# Patient Record
Sex: FEMALE | Race: ASIAN | NOT HISPANIC OR LATINO | Employment: FULL TIME | URBAN - METROPOLITAN AREA
[De-identification: names, ages, dates, MRNs, and addresses within clinical notes are randomized per-mention and may not be internally consistent; named-entity substitution may affect disease eponyms.]

---

## 2018-01-11 ENCOUNTER — TRANSFERRED RECORDS (OUTPATIENT)
Dept: HEALTH INFORMATION MANAGEMENT | Facility: CLINIC | Age: 48
End: 2018-01-11

## 2018-01-24 ENCOUNTER — TRANSFERRED RECORDS (OUTPATIENT)
Dept: HEALTH INFORMATION MANAGEMENT | Facility: CLINIC | Age: 48
End: 2018-01-24

## 2018-04-10 ENCOUNTER — TRANSFERRED RECORDS (OUTPATIENT)
Dept: HEALTH INFORMATION MANAGEMENT | Facility: CLINIC | Age: 48
End: 2018-04-10

## 2018-04-10 LAB — PAP-ABSTRACT: NORMAL

## 2018-05-15 ENCOUNTER — TRANSFERRED RECORDS (OUTPATIENT)
Dept: HEALTH INFORMATION MANAGEMENT | Facility: CLINIC | Age: 48
End: 2018-05-15

## 2018-05-15 LAB — PHQ9 SCORE: 5

## 2018-05-24 ENCOUNTER — TRANSFERRED RECORDS (OUTPATIENT)
Dept: HEALTH INFORMATION MANAGEMENT | Facility: CLINIC | Age: 48
End: 2018-05-24

## 2019-03-06 ENCOUNTER — TRANSFERRED RECORDS (OUTPATIENT)
Dept: HEALTH INFORMATION MANAGEMENT | Facility: CLINIC | Age: 49
End: 2019-03-06

## 2019-06-11 ENCOUNTER — TRANSFERRED RECORDS (OUTPATIENT)
Dept: HEALTH INFORMATION MANAGEMENT | Facility: CLINIC | Age: 49
End: 2019-06-11

## 2019-09-16 PROBLEM — D50.9 IRON DEFICIENCY ANEMIA: Status: ACTIVE | Noted: 2018-04-10

## 2019-09-16 PROBLEM — Z98.890 S/P CRANIOTOMY: Status: ACTIVE | Noted: 2018-01-11

## 2019-09-16 PROBLEM — R47.01 EXPRESSIVE APHASIA: Status: ACTIVE | Noted: 2018-01-11

## 2019-09-16 PROBLEM — E04.1 THYROID NODULE: Status: ACTIVE | Noted: 2017-12-26

## 2019-09-16 PROBLEM — Z86.73 HX OF ISCHEMIC LEFT MCA STROKE: Status: ACTIVE | Noted: 2018-01-11

## 2019-09-16 PROBLEM — R41.841 COGNITIVE COMMUNICATION DEFICIT: Status: ACTIVE | Noted: 2017-12-18

## 2019-09-16 PROBLEM — I67.5 MOYA MOYA DISEASE: Status: ACTIVE | Noted: 2018-01-11

## 2019-09-16 PROBLEM — E55.9 VITAMIN D DEFICIENCY: Status: ACTIVE | Noted: 2018-12-11

## 2019-09-16 PROBLEM — F90.9 ATTENTION DEFICIT HYPERACTIVITY DISORDER (ADHD), UNSPECIFIED ADHD TYPE: Status: ACTIVE | Noted: 2018-04-09

## 2019-09-16 RX ORDER — FERROUS SULFATE 324(65)MG
325 TABLET, DELAYED RELEASE (ENTERIC COATED) ORAL
COMMUNITY
End: 2019-09-17

## 2019-09-16 RX ORDER — OXYCODONE AND ACETAMINOPHEN 5; 325 MG/1; MG/1
1-2 TABLET ORAL
COMMUNITY
Start: 2018-05-26 | End: 2019-09-17

## 2019-09-16 RX ORDER — ASPIRIN 325 MG
325 TABLET ORAL DAILY
COMMUNITY
Start: 2018-01-16

## 2019-09-16 RX ORDER — FAMOTIDINE 20 MG/1
20 TABLET, FILM COATED ORAL DAILY
COMMUNITY
Start: 2018-05-27 | End: 2019-09-17

## 2019-09-16 RX ORDER — POLYETHYLENE GLYCOL 3350 17 G/17G
17 POWDER, FOR SOLUTION ORAL
COMMUNITY
Start: 2018-05-26 | End: 2019-09-17

## 2019-09-16 NOTE — PROGRESS NOTES
SUBJECTIVE:   Mariela Rodriguez is a 49 year old female who presents to clinic today for the following health issues:    History of Stroke/MoyaMoya:  Moyamoya disease/history of left hemispheric watershed type ischemic event in fall 2017. Cerebral revascularization was offered and patient has undergone this surgery on the left (worse side) 1/2018 and right 5/2018. She has mild expressive aphasia with some stuttering from her stroke, which has improved with OT and speech therapy - completed OT and speech therapy. Is following with Neurosurgery (Dr. Devyn Tamez and Giovanna Hernández annually). She was not on OCP's when she had her stroke, however, she was on OCP's in the past. No history of anticoagulative disorders.     Thyroid nodule  Incidental finding during her preop for her cerebral revascularization. The patient has not pursued further evaluation, though, reports weight gain. TSH on 12/10/18 was within normal limits. Exercises at home x2/week with body weight exercises. She does not perform cardiovascular exercises.     Iron deficiency anemia:  Not taking iron. Menstrual cycles are regular and last 8/27/2019. Last CBC on 12/10/18 was normal.    Elevated Blood Pressure:  She reports fluctuating hypertensive/hypotensive readings when her vitals were checked [facility]. She has a digital sphygmomanometer at home but does not recall her readings today. She does not take anti-hypertensive medications as she was not officially diagnosed with hypertension. She has been to a Cardiologist for this in the past who did not recommend she start anti-hypertensive medications.    BP Readings from Last 6 Encounters:   09/17/19 112/70     ADHD:   Diagnosed with ADHD when she was in 5th grade. She was on Ritalin in the past. Adderall was discontinued by her Neurosurgeon to avoid triggering tachycardia and hypertension and subsequently potentially CVA in a patient with MoyaMoya. She requests to be started on a different  stimulant to help with her focus/attention.     Vitamin D Deficiency:  She does not take vitamin D supplements despite being deficient. Mentions eating a well-balanced diet and tries to avoid excessive sugar/carbohydrates. Vitamin D level on 12/10/18 was 8 ng/mL.    Social:  The patient does not have a primary care provider. She has been to [facility] in the past. She was adopted when she was 6 years old.     Problem list and histories reviewed & adjusted, as indicated.  Additional history: as documented    Patient Active Problem List   Diagnosis     Attention deficit hyperactivity disorder (ADHD), unspecified ADHD type     Cognitive communication deficit     Expressive aphasia     Hx of ischemic left MCA stroke - 2017 - due to Moyamoya disease     Iron deficiency anemia     Tolbert tolbert disease     S/P craniotomy     Thyroid nodule     Vitamin D deficiency     Past Surgical History:   Procedure Laterality Date     C INTRA-EXTRACRANIAL ART ANASTOM  2018    left extracranial intercranial bypass supine position     extracranial bypass  2018    right extracranial intracranial bypass     TONSILLECTOMY       [facility]         Social History     Tobacco Use     Smoking status: Former Smoker     Last attempt to quit: 2017     Years since quittin.7     Smokeless tobacco: Never Used     Tobacco comment: social smoker    Substance Use Topics     Alcohol use: Yes     Alcohol/week: 3.0 oz     Types: 5 Standard drinks or equivalent per week     Family History   Adopted: Yes   Problem Relation Age of Onset     Unknown/Adopted Mother      Unknown/Adopted Father          Current Outpatient Medications   Medication Sig Dispense Refill     aspirin (ASA) 325 MG tablet Take 325 mg by mouth daily       atomoxetine (STRATTERA) 25 MG capsule Take 1 capsule (25 mg) by mouth daily 90 capsule 1     No Known Allergies    Reviewed and updated as needed this visit by clinical staff  Tobacco  Allergies   "Meds  Problems  Med Hx  Surg Hx  Fam Hx  Soc Hx        Reviewed and updated as needed this visit by Provider  Tobacco  Allergies  Meds  Problems  Med Hx  Surg Hx  Fam Hx  Soc Hx          ROS:  Constitutional, HEENT, cardiovascular, pulmonary, GI, , musculoskeletal, neuro, skin, endocrine and psych systems are negative, except as otherwise noted.    This document serves as a record of the services and decisions personally performed and made by Susan Brown, MS, PA-C. It was created on her behalf by Nnamdi King, a trained medical scribe. The creation of this document is based the provider's statements to the medical scribe.  Nnamdi King September 17, 2019 7:58 AM  OBJECTIVE:   /70 (BP Location: Right arm, Cuff Size: Adult Regular)   Pulse 99   Temp 98.4  F (36.9  C) (Oral)   Ht 1.568 m (5' 1.75\")   Wt 61.7 kg (136 lb)   LMP 08/27/2019 (Exact Date)   SpO2 98%   Breastfeeding? No   BMI 25.08 kg/m   Body mass index is 25.08 kg/m .    GENERAL: healthy, alert and no distress  EYES: Eyes grossly normal to inspection and conjunctivae and sclerae normal  HENT: Small, mobile nodule on the left lobe of her thyroid, otherwise, ear canals and TM's normal, nose and mouth without ulcers or lesions  RESP: lungs clear to auscultation - no rales, rhonchi or wheezes  CV: regular rate and rhythm, normal S1 S2, no S3 or S4, no murmur, click or rub, no peripheral edema and peripheral pulses strong  MS: no gross musculoskeletal defects noted, no edema  SKIN: no suspicious lesions or rashes  NEURO: Normal strength and tone, mentation intact and speech normal  PSYCH: mentation appears normal, affect normal/bright    Diagnostic Test Results:  Labs reviewed in Epic  ASSESSMENT/PLAN:   Mariela was seen today for establish care.    Diagnoses and all orders for this visit:    Tolbert tolbert disease  Hx of ischemic left MCA stroke - 12/2017 - due to Moyamoya disease  Stable. Follows with Dr. Tamez and Giovanna Hernández of " Neurosurgery annually. Last MRI was stable per patient report. Will check lipid panel today. Advised she continue to follow with her Neurosurgical team.  -     Lipid panel reflex to direct LDL Fasting    Attention deficit hyperactivity disorder (ADHD), unspecified ADHD type  Diagnosed with ADHD when she was in 5th grade. She was on Ritalin in the past. Adderall was discontinued by her Neurosurgeon to avoid tachycardia/hypertension and subsequently potentially CVA in a patient with MoyaMoya and history of ischemic left MCA stroke. She requested to be started on a different stimulant to help with her focus/attention. Will start her on Strattera 25 mg every day. Will check her Vitamin B6 and B12 levels.  -     atomoxetine (STRATTERA) 25 MG capsule; Take 1 capsule (25 mg) by mouth daily  -     Vitamin D Deficiency  -     Vitamin B12  -     Vitamin B6    Elevated BP without diagnosis of hypertension  Normotensive blood pressure today in clinic, however, she has had fluctuating readings at [facility] in the past. She has a digital sphygmomanometer at home but does not recall her readings today. She does not take anti-hypertensive medications as she was not officially diagnosed with hypertension. She has been to a Cardiologist for this in the past who did not recommend she start anti-hypertensive medications. Ordered a 24 hour blood pressure monitor for better assessment and a routine CMP.  -     24 Hour Blood Pressure Monitor - Adult; Future  -     Comprehensive metabolic panel    Vitamin D deficiency  Vitamin D level on 12/10/18 was 8 ng/mL. She does not take vitamin D supplements. I recommended she start Vitamin D supplements. Will recheck levels today.  -     Vitamin D Deficiency    Iron deficiency anemia, unspecified iron deficiency anemia type  Not taking iron supplements. Menstrual cycles are regular and last 8/27/2019. Last CBC on 12/10/18 was normal. Will recheck CBC and Ferritin levels today.  -      CBC with platelets  -     Ferritin    Thyroid nodule  Incidental finding during her preop for her cerebral revascularization. The patient has not pursued further evaluation, though, reports weight gain today. TSH on 12/10/18 was within normal limits. Exam today showed a small, mobile nodule in her left thyroid lobe. Will recheck thyroid functions and have her perform a thyroid ultrasound at her earliest convenience for reevaluation. I recommended exercise and a well-balanced diet to help with weight loss. I discussed that the American Heart Association recommends at least 150 minutes per week of moderate exercise or 75 minutes per week of vigorous exercise (or a combination of moderate and vigorous activity).  -     US Thyroid; Future  -     TSH  -     T4, free  -     T3, Free    Visit for screening mammogram  Preventative health care  She has not performed a Mammogram in the past. Ordered today.  -     *MA Screening Digital Bilateral; Future    Return in about 1 week (around 9/24/2019) for BP monitor, thyroid ultrasoun.    The information in this document, created by the medical scribe for me, accurately reflects the services I personally performed and the decisions made by me. I have reviewed and approved this document for accuracy prior to leaving the patient care area.  Susan Brown PA-C  [facility] [place]

## 2019-09-17 ENCOUNTER — OFFICE VISIT (OUTPATIENT)
Dept: FAMILY MEDICINE | Facility: CLINIC | Age: 49
End: 2019-09-17
Payer: COMMERCIAL

## 2019-09-17 VITALS
HEIGHT: 62 IN | DIASTOLIC BLOOD PRESSURE: 70 MMHG | BODY MASS INDEX: 25.03 KG/M2 | WEIGHT: 136 LBS | OXYGEN SATURATION: 98 % | TEMPERATURE: 98.4 F | SYSTOLIC BLOOD PRESSURE: 112 MMHG | HEART RATE: 99 BPM

## 2019-09-17 DIAGNOSIS — Z86.73 HX OF ISCHEMIC LEFT MCA STROKE: ICD-10-CM

## 2019-09-17 DIAGNOSIS — Z00.00 PREVENTATIVE HEALTH CARE: ICD-10-CM

## 2019-09-17 DIAGNOSIS — Z12.31 VISIT FOR SCREENING MAMMOGRAM: ICD-10-CM

## 2019-09-17 DIAGNOSIS — E55.9 VITAMIN D DEFICIENCY: ICD-10-CM

## 2019-09-17 DIAGNOSIS — E04.1 THYROID NODULE: ICD-10-CM

## 2019-09-17 DIAGNOSIS — R03.0 ELEVATED BP WITHOUT DIAGNOSIS OF HYPERTENSION: ICD-10-CM

## 2019-09-17 DIAGNOSIS — I67.5 MOYA MOYA DISEASE: Primary | ICD-10-CM

## 2019-09-17 DIAGNOSIS — D50.9 IRON DEFICIENCY ANEMIA, UNSPECIFIED IRON DEFICIENCY ANEMIA TYPE: ICD-10-CM

## 2019-09-17 DIAGNOSIS — E78.1 HYPERTRIGLYCERIDEMIA: ICD-10-CM

## 2019-09-17 DIAGNOSIS — F90.9 ATTENTION DEFICIT HYPERACTIVITY DISORDER (ADHD), UNSPECIFIED ADHD TYPE: ICD-10-CM

## 2019-09-17 LAB
ERYTHROCYTE [DISTWIDTH] IN BLOOD BY AUTOMATED COUNT: 14.8 % (ref 10–15)
HCT VFR BLD AUTO: 36.8 % (ref 35–47)
HGB BLD-MCNC: 11.7 G/DL (ref 11.7–15.7)
MCH RBC QN AUTO: 24.9 PG (ref 26.5–33)
MCHC RBC AUTO-ENTMCNC: 31.8 G/DL (ref 31.5–36.5)
MCV RBC AUTO: 79 FL (ref 78–100)
PLATELET # BLD AUTO: 397 10E9/L (ref 150–450)
RBC # BLD AUTO: 4.69 10E12/L (ref 3.8–5.2)
T3FREE SERPL-MCNC: 2.4 PG/ML (ref 2.3–4.2)
VIT B12 SERPL-MCNC: 446 PG/ML (ref 193–986)
WBC # BLD AUTO: 5.7 10E9/L (ref 4–11)

## 2019-09-17 PROCEDURE — 84207 ASSAY OF VITAMIN B-6: CPT | Mod: 90 | Performed by: PHYSICIAN ASSISTANT

## 2019-09-17 PROCEDURE — 85027 COMPLETE CBC AUTOMATED: CPT | Performed by: PHYSICIAN ASSISTANT

## 2019-09-17 PROCEDURE — 82607 VITAMIN B-12: CPT | Performed by: PHYSICIAN ASSISTANT

## 2019-09-17 PROCEDURE — 84481 FREE ASSAY (FT-3): CPT | Performed by: PHYSICIAN ASSISTANT

## 2019-09-17 PROCEDURE — 99204 OFFICE O/P NEW MOD 45 MIN: CPT | Performed by: PHYSICIAN ASSISTANT

## 2019-09-17 PROCEDURE — 36415 COLL VENOUS BLD VENIPUNCTURE: CPT | Performed by: PHYSICIAN ASSISTANT

## 2019-09-17 PROCEDURE — 99000 SPECIMEN HANDLING OFFICE-LAB: CPT | Performed by: PHYSICIAN ASSISTANT

## 2019-09-17 PROCEDURE — 84443 ASSAY THYROID STIM HORMONE: CPT | Performed by: PHYSICIAN ASSISTANT

## 2019-09-17 PROCEDURE — 82728 ASSAY OF FERRITIN: CPT | Performed by: PHYSICIAN ASSISTANT

## 2019-09-17 PROCEDURE — 84439 ASSAY OF FREE THYROXINE: CPT | Performed by: PHYSICIAN ASSISTANT

## 2019-09-17 PROCEDURE — 80061 LIPID PANEL: CPT | Performed by: PHYSICIAN ASSISTANT

## 2019-09-17 PROCEDURE — 83721 ASSAY OF BLOOD LIPOPROTEIN: CPT | Mod: 59 | Performed by: PHYSICIAN ASSISTANT

## 2019-09-17 PROCEDURE — 80053 COMPREHEN METABOLIC PANEL: CPT | Performed by: PHYSICIAN ASSISTANT

## 2019-09-17 PROCEDURE — 82306 VITAMIN D 25 HYDROXY: CPT | Performed by: PHYSICIAN ASSISTANT

## 2019-09-17 RX ORDER — ATOMOXETINE 25 MG/1
25 CAPSULE ORAL DAILY
Qty: 90 CAPSULE | Refills: 1 | Status: SHIPPED | OUTPATIENT
Start: 2019-09-17 | End: 2020-02-13

## 2019-09-17 ASSESSMENT — MIFFLIN-ST. JEOR: SCORE: 1201.17

## 2019-09-18 LAB
ALBUMIN SERPL-MCNC: 4.2 G/DL (ref 3.4–5)
ALP SERPL-CCNC: 67 U/L (ref 40–150)
ALT SERPL W P-5'-P-CCNC: 19 U/L (ref 0–50)
ANION GAP SERPL CALCULATED.3IONS-SCNC: 5 MMOL/L (ref 3–14)
AST SERPL W P-5'-P-CCNC: 15 U/L (ref 0–45)
BILIRUB SERPL-MCNC: 0.2 MG/DL (ref 0.2–1.3)
BUN SERPL-MCNC: 10 MG/DL (ref 7–30)
CALCIUM SERPL-MCNC: 8.6 MG/DL (ref 8.5–10.1)
CHLORIDE SERPL-SCNC: 106 MMOL/L (ref 94–109)
CHOLEST SERPL-MCNC: 280 MG/DL
CO2 SERPL-SCNC: 25 MMOL/L (ref 20–32)
CREAT SERPL-MCNC: 0.66 MG/DL (ref 0.52–1.04)
FERRITIN SERPL-MCNC: 4 NG/ML (ref 8–252)
GFR SERPL CREATININE-BSD FRML MDRD: >90 ML/MIN/{1.73_M2}
GLUCOSE SERPL-MCNC: 116 MG/DL (ref 70–99)
HDLC SERPL-MCNC: 35 MG/DL
LDLC SERPL CALC-MCNC: ABNORMAL MG/DL
LDLC SERPL DIRECT ASSAY-MCNC: 105 MG/DL
NONHDLC SERPL-MCNC: 245 MG/DL
POTASSIUM SERPL-SCNC: 4.1 MMOL/L (ref 3.4–5.3)
PROT SERPL-MCNC: 8.1 G/DL (ref 6.8–8.8)
SODIUM SERPL-SCNC: 136 MMOL/L (ref 133–144)
T4 FREE SERPL-MCNC: 0.98 NG/DL (ref 0.76–1.46)
TRIGL SERPL-MCNC: 750 MG/DL
TSH SERPL DL<=0.005 MIU/L-ACNC: 1.06 MU/L (ref 0.4–4)

## 2019-09-19 ENCOUNTER — HOSPITAL ENCOUNTER (OUTPATIENT)
Dept: ULTRASOUND IMAGING | Facility: CLINIC | Age: 49
Discharge: HOME | End: 2019-09-19
Attending: PHYSICIAN ASSISTANT | Admitting: PHYSICIAN ASSISTANT
Payer: COMMERCIAL

## 2019-09-19 DIAGNOSIS — E04.1 THYROID NODULE: ICD-10-CM

## 2019-09-19 PROBLEM — E78.1 HYPERTRIGLYCERIDEMIA: Status: ACTIVE | Noted: 2019-09-19

## 2019-09-19 PROBLEM — R03.0 ELEVATED BP WITHOUT DIAGNOSIS OF HYPERTENSION: Status: ACTIVE | Noted: 2019-09-19

## 2019-09-19 LAB
DEPRECATED CALCIDIOL+CALCIFEROL SERPL-MC: 24 UG/L (ref 20–75)
VIT B6 SERPL-MCNC: 22.6 NMOL/L (ref 20–125)

## 2019-09-19 PROCEDURE — 76536 US EXAM OF HEAD AND NECK: CPT

## 2019-09-19 RX ORDER — FENOFIBRATE 145 MG/1
145 TABLET, COATED ORAL DAILY
Qty: 90 TABLET | Refills: 0 | Status: SHIPPED | OUTPATIENT
Start: 2019-09-19 | End: 2019-10-02

## 2019-09-20 NOTE — RESULT ENCOUNTER NOTE
PLease call pt.  Her thyroid ultrasound shows multiple nodules, however two are larger than the others (the small ones are not concerning).  Due to a bit of calcification in the nodules and the size >1 cm in diameter I would like to get a fine needle aspiration biopsy to ensure these are benign (highly likely this is the case!).  If pt is in agreement, I will order the scan.  Please route back to me and I will order.

## 2019-09-26 ENCOUNTER — HOSPITAL ENCOUNTER (OUTPATIENT)
Dept: CARDIOLOGY | Facility: CLINIC | Age: 49
Discharge: HOME | End: 2019-09-26
Attending: PHYSICIAN ASSISTANT | Admitting: PHYSICIAN ASSISTANT
Payer: COMMERCIAL

## 2019-09-26 DIAGNOSIS — R03.0 ELEVATED BP WITHOUT DIAGNOSIS OF HYPERTENSION: ICD-10-CM

## 2019-09-26 PROCEDURE — 93790 AMBL BP MNTR W/SW I&R: CPT | Performed by: INTERNAL MEDICINE

## 2019-09-26 PROCEDURE — 93788 AMBL BP MNTR W/SW A/R: CPT

## 2019-09-27 ENCOUNTER — HOSPITAL ENCOUNTER (OUTPATIENT)
Dept: ULTRASOUND IMAGING | Facility: CLINIC | Age: 49
Discharge: HOME | End: 2019-09-27
Attending: PHYSICIAN ASSISTANT | Admitting: PHYSICIAN ASSISTANT
Payer: COMMERCIAL

## 2019-09-27 DIAGNOSIS — E04.1 THYROID NODULE: ICD-10-CM

## 2019-09-27 PROCEDURE — 88173 CYTOPATH EVAL FNA REPORT: CPT | Mod: 26 | Performed by: RADIOLOGY

## 2019-09-27 PROCEDURE — 25000125 ZZHC RX 250: Performed by: RADIOLOGY

## 2019-09-27 PROCEDURE — 00000102 ZZHCL STATISTIC CYTO WRIGHT STAIN TC: Performed by: RADIOLOGY

## 2019-09-27 PROCEDURE — 88173 CYTOPATH EVAL FNA REPORT: CPT | Performed by: RADIOLOGY

## 2019-09-27 PROCEDURE — 10005 FNA BX W/US GDN 1ST LES: CPT

## 2019-09-27 RX ADMIN — LIDOCAINE HYDROCHLORIDE 2 ML: 10 INJECTION, SOLUTION EPIDURAL; INFILTRATION; INTRACAUDAL; PERINEURAL at 10:35

## 2019-09-27 NOTE — PROGRESS NOTES
Consent for left thyroid nodule obtained by Dr. Villarreal.  Pt tolerated procedure well.  States understanding of site care discharge instructions.  Pt discharged per self to home ambulatory with no evident bleeding or complications.

## 2019-10-01 ENCOUNTER — TELEPHONE (OUTPATIENT)
Dept: FAMILY MEDICINE | Facility: CLINIC | Age: 49
End: 2019-10-01

## 2019-10-01 ENCOUNTER — HEALTH MAINTENANCE LETTER (OUTPATIENT)
Age: 49
End: 2019-10-01

## 2019-10-01 DIAGNOSIS — E78.1 HYPERTRIGLYCERIDEMIA: Primary | ICD-10-CM

## 2019-10-01 LAB — COPATH REPORT: NORMAL

## 2019-10-01 NOTE — TELEPHONE ENCOUNTER
What would they propose for an alternative?  Can [facility] do a trial fibrate test claim?  I thought they were all generic      Susan Brown MS, PA-C

## 2019-10-01 NOTE — TELEPHONE ENCOUNTER
Called pharmacy per providers request.  Pharmacist is currently at lunch and technician asked for clinic to call back in about 20 minutes.    JACLYN JettN, RN  [employer] [facility]

## 2019-10-01 NOTE — TELEPHONE ENCOUNTER
Called [facility] regarding the fenofibrate medication.  Per pharmacist, insurance will  states cover the 54mg or the 160 mg dose.  Pharmacist is recommending the 160 mg dose.    Routing back to provider for review and advise as appropriate.  Pharmacy is pended.    JACLYN JettN, RN  [employer] T[facility]

## 2019-10-01 NOTE — TELEPHONE ENCOUNTER
Received notice from [facility] pharmacy that Fenofibrate medication in dose 145 mg is not covered by patients insurance.     Routing to provider to see if provider would like a PA started, and if so any reasoning that should be put in PA. Or if provider would like to change therapy, please send in a new prescription.     Routing to Prescribing provider    Rachana Alvarado MA

## 2019-10-02 RX ORDER — FENOFIBRATE 160 MG/1
160 TABLET ORAL DAILY
Qty: 90 TABLET | Refills: 1 | Status: SHIPPED | OUTPATIENT
Start: 2019-10-02 | End: 2020-02-13

## 2019-10-02 NOTE — RESULT ENCOUNTER NOTE
Please call pt.  Patient has a very mildly elevated systolic BP elevation, however, in light of her previous strokes and risk for recurrence, I would like to start her on a low dose BP medication.  If in agreement, please send in amlodipine 5 mg once daily #90, 1 refill.  Would like to see her back in 6 months for a med check for both this RX and the recent start of the cholesterol medication.  She should be fasting for this appt.  (reminder I will be out for  duty Apr 13-30, 2020)

## 2019-10-02 NOTE — RESULT ENCOUNTER NOTE
Please call patient.  Her thyroid biopsy is consistent with a benign process.  We will continue to follow this clinically and repeat ultrasounds annually for at least a few years to ensure stability.

## 2019-10-07 ENCOUNTER — TELEPHONE (OUTPATIENT)
Dept: FAMILY MEDICINE | Facility: CLINIC | Age: 49
End: 2019-10-07

## 2019-10-07 DIAGNOSIS — R03.0 ELEVATED BP WITHOUT DIAGNOSIS OF HYPERTENSION: Primary | ICD-10-CM

## 2019-10-07 RX ORDER — AMLODIPINE BESYLATE 5 MG/1
5 TABLET ORAL DAILY
Qty: 90 TABLET | Refills: 1 | Status: SHIPPED | OUTPATIENT
Start: 2019-10-07 | End: 2020-02-13

## 2019-10-07 NOTE — TELEPHONE ENCOUNTER
Pt called back and was advised of the results note and recommendations to start Amlodipine 5 mg  PO daily #90 with 1 refill and also start fenofibrate. Pt agreed with plan and noted this could be sent to [facility] in [facility].    Pt advised to recheck BP in 3 months or sooner if complications.     Danny Palmer RN   [facility] [place][note 2]

## 2019-10-11 NOTE — RESULT ENCOUNTER NOTE
Triage: please call pt with results/recommendations:     Mariela  I have reviewed your recent labs. Here are the results:    -Normal red blood cell (hgb) levels, normal white blood cell count and normal platelet levels.  -LDL(bad) cholesterol level is elevated, HDL(good) cholesterol level is low and your triglycerides are VERY elevated which can increase your heart disease risk.  A diet high in fat and simple carbohydrates, genetics and being overweight can contribute to this. ADVISE: exercising 150 minutes of aerobic exercise per week (30 minutes for 5 days per week or 50 minutes for 3 days per week are options), eating a low saturated fat/low carbohydrate diet, and omega-3 fatty acids (fish oil) 3534-4849 mg daily are helpful to improve this. I would like you to start on a cholesterol lowering medication called Tricor.  This was sent to your pharmacy.  You should recheck your fasting cholesterol panel by scheduling a lab-only appointment in 2 months  -Liver and gallbladder tests (ALT,AST, Alk phos,bilirubin) are normal.  -Kidney function (GFR) is normal.  -Sodium is normal.  -Potassium is normal.  -Calcium is normal.  -Glucose is slight elevated and may be a sign of early diabetes (prediabetes). ADVISE:: eating a low carbohydrate diet, exercising, trying to lose weight (if necessary) and rechecking your glucose level in 12 months.  -TSH (thyroid stimulating hormone) level is normal which indicates normal thyroid function.  -HIV test is normal.  -VERY Low iron store levels (ferritin).  ADVISE: increasing iron in your diet and consider taking iron supplement called Vitron C twice daily - to avoid constipation from the supplement you should increase fluid intake and fiber in your diet)  Also, recheck your labs in 2 months.  -B12 level is normal.    For additional lab test information, labtestsonline.org is an excellent reference.     If you have any questions please do not hesitate to contact our office via phone  ([phone]) or [email or web address].    Susan Brown, MS, PA-C  [facility]   ventilator/Cardiac Monitor/Defib/ACLS/Rescue Kit/O2/BVM

## 2019-10-14 ENCOUNTER — HOSPITAL ENCOUNTER (OUTPATIENT)
Dept: MAMMOGRAPHY | Facility: CLINIC | Age: 49
Discharge: HOME | End: 2019-10-14
Attending: PHYSICIAN ASSISTANT | Admitting: PHYSICIAN ASSISTANT
Payer: COMMERCIAL

## 2019-10-14 DIAGNOSIS — Z00.00 PREVENTATIVE HEALTH CARE: ICD-10-CM

## 2019-10-14 PROCEDURE — 77067 SCR MAMMO BI INCL CAD: CPT

## 2020-02-11 NOTE — PROGRESS NOTES
"  SUBJECTIVE:   Mariela Rodriguez is a 49 year old female who presents to clinic today for the following health issues:    Hypertension Follow-up  Patient was taking amlodipine 5 mg medication daily.  Ran out ~1 month ago.      Do you check your blood pressure regularly outside of the clinic? Yes     Are you following a low salt diet? No    Are your blood pressures ever more than 140 on the top number (systolic) OR more   than 90 on the bottom number (diastolic), for example 140/90? Yes    Hyperlipidemia/[place] Disease/History of ischemic left MCA stroke  Patient had a history of left MCA ischemic stroke in December 2017.  She has hyperlipidemia that has gone untreated until recently.  She was started on fenofibrate in October 2019 secondary to hypertriglyceridemia.  She ran out of this medication approximately 1 month ago.  She was tolerating this well.  She continues to take aspirin 325 mg once daily.  Her last imaging was 6/11/2019 and was stable.  She follows with neurosurgery once a year.    Recent Labs   Lab Test 09/17/19  1011   CHOL 280*   HDL 35*   LDL Cannot estimate LDL when triglyceride exceeds 400 mg/dL  105*   TRIG 750*         ADD  Lengthy discussion in September 2019 regarding the use of stimulants with her underlying moyamoya disease and history of ischemic stroke.  She was prescribed Strattera 9/17/2019 and reports that she \"did not like the way that it made her feel \".  She is really hoping to be prescribed something like Vyvanse.  Adderall has worked well for her in the past.  Ritalin did not work as well.  She is struggling with her focus and task completion during work.  She does not understand why she should not be on a stimulant medication with her underlying co morbidities and reports that her neurosurgeon is \"okay with it \".      Problem list and histories reviewed & adjusted, as indicated.  Additional history: as documented    Patient Active Problem List   Diagnosis     Attention " deficit hyperactivity disorder (ADHD), unspecified ADHD type     Cognitive communication deficit     Expressive aphasia     Hx of ischemic left MCA stroke - 12/2017 - due to Moyamoya disease     Iron deficiency anemia     Tolbert tolbert disease     S/P craniotomy     Thyroid nodule - FNA consistent with benign process 9/2019, repeat ultrasound 10/2020     Vitamin D deficiency     Hypertriglyceridemia     Elevated BP without diagnosis of hypertension     MAGEN II (cervical intraepithelial neoplasia II)     Past Surgical History:   Procedure Laterality Date     C INTRA-EXTRACRANIAL ART ANASTOM  01/11/2018    left extracranial intercranial bypass supine position     extracranial bypass  05/24/2018    right extracranial intracranial bypass     TONSILLECTOMY       [facility]         Social History     Tobacco Use     Smoking status: Former Smoker     Last attempt to quit: 2017     Years since quitting: 3.1     Smokeless tobacco: Never Used     Tobacco comment: social smoker    Substance Use Topics     Alcohol use: Yes     Alcohol/week: 5.0 standard drinks     Types: 5 Standard drinks or equivalent per week     Family History   Adopted: Yes   Problem Relation Age of Onset     Unknown/Adopted Mother      Unknown/Adopted Father          Current Outpatient Medications   Medication Sig Dispense Refill     amLODIPine (NORVASC) 5 MG tablet Take 1 tablet (5 mg) by mouth daily 90 tablet 1     buPROPion (WELLBUTRIN XL) 150 MG 24 hr tablet Take 1 tablet (150 mg) by mouth every morning 30 tablet 0     fenofibrate (TRIGLIDE/LOFIBRA) 160 MG tablet Take 1 tablet (160 mg) by mouth daily 90 tablet 1     aspirin (ASA) 325 MG tablet Take 325 mg by mouth daily       ferrous fumarate 65 mg, [place]. FE,-Vitamin C 125 mg (VITRON-C)  MG TABS tablet Take 1 tablet by mouth 2 times daily (with meals) (Patient not taking: Reported on 2/13/2020) 100 tablet 3     No Known Allergies    Reviewed and updated as needed this visit by clinical  "staff  Tobacco  Allergies  Meds  Problems  Med Hx  Surg Hx  Fam Hx  Soc Hx        Reviewed and updated as needed this visit by Provider  Tobacco  Allergies  Meds  Problems  Med Hx  Surg Hx  Fam Hx         ROS:  Constitutional, HEENT, cardiovascular, pulmonary, GI, , musculoskeletal, neuro, skin, endocrine and psych systems are negative, except as otherwise noted.    OBJECTIVE:   /78 (BP Location: Right arm, Cuff Size: Adult Regular)   Pulse 123   Temp 98.9  F (37.2  C) (Oral)   Ht 1.568 m (5' 1.75\")   Wt 62.6 kg (138 lb)   SpO2 98%   BMI 25.45 kg/m   Body mass index is 25.45 kg/m .    GENERAL: healthy, alert and no distress  RESP: lungs clear to auscultation - no rales, rhonchi or wheezes  CV: regular rate and rhythm, normal S1 S2, no S3 or S4, no murmur, click or rub, no peripheral edema and peripheral pulses strong  MS: no gross musculoskeletal defects noted, no edema  SKIN: no suspicious lesions or rashes  NEURO: Normal strength and tone, mentation intact and speech normal  PSYCH: mentation appears normal, affect normal/bright, very distractible during interview    Diagnostic Test Results:  none   ASSESSMENT/PLAN:   Mariela was seen today for recheck medication and imm/inj.    Diagnoses and all orders for this visit:    Hypertriglyceridemia,   Tolbert tolbert disease  Hx of ischemic left MCA stroke - 12/2017 - due to Moyamoya disease  Recommended restart of medication.  After on medications for approximately 1 month casual fasting lab only appointment to recheck cholesterol levels.  -     fenofibrate (TRIGLIDE/LOFIBRA) 160 MG tablet; Take 1 tablet (160 mg) by mouth daily    Elevated BP without diagnosis of hypertension  Restart medications.  Recheck labs in approximately 1 month with fasting lab visit.  -     amLODIPine (NORVASC) 5 MG tablet; Take 1 tablet (5 mg) by mouth daily    Attention deficit hyperactivity disorder (ADHD), unspecified ADHD type  Advised patient that stimulant medication " is not recommended with her underlying comorbidities.  Patient frustrated that she is unable to restart stimulant medications.  Due to failed trial of Strattera recommend attempt of bupropion to see if this helps focus and overall generalized anxiety.  Patient voiced understanding and agreement.  -     buPROPion (WELLBUTRIN XL) 150 MG 24 hr tablet; Take 1 tablet (150 mg) by mouth every morning    Need for prophylactic vaccination and inoculation against influenza  Vaccinated today  -     INFLUENZA VACCINE IM > 6 MONTHS VALENT IIV4 [08231]  -     Vaccine Administration, Initial [83942]        Patient Instructions   Restart medications    Schedule lab only in ~4 weeks (after on meds for ~1 month) - can schedule at any [facility] (fasting - 8 hours - black coffee/water are fine).    Check into mail order pharmacy preference for insurance and send info to me (send address or fax number to narrow down).      Return in about 4 weeks (around 3/12/2020) for Lab Work (fasting for ~8 hours).     [address] [address]  [email or web address]   Office: [phone]           Susan Brown MS, PA-C

## 2020-02-13 ENCOUNTER — OFFICE VISIT (OUTPATIENT)
Dept: FAMILY MEDICINE | Facility: CLINIC | Age: 50
End: 2020-02-13
Payer: COMMERCIAL

## 2020-02-13 VITALS
SYSTOLIC BLOOD PRESSURE: 138 MMHG | DIASTOLIC BLOOD PRESSURE: 78 MMHG | TEMPERATURE: 98.9 F | HEART RATE: 123 BPM | WEIGHT: 138 LBS | HEIGHT: 62 IN | OXYGEN SATURATION: 98 % | BODY MASS INDEX: 25.4 KG/M2

## 2020-02-13 DIAGNOSIS — F90.9 ATTENTION DEFICIT HYPERACTIVITY DISORDER (ADHD), UNSPECIFIED ADHD TYPE: ICD-10-CM

## 2020-02-13 DIAGNOSIS — R03.0 ELEVATED BP WITHOUT DIAGNOSIS OF HYPERTENSION: ICD-10-CM

## 2020-02-13 DIAGNOSIS — I67.5 MOYA MOYA DISEASE: ICD-10-CM

## 2020-02-13 DIAGNOSIS — Z86.73 HX OF ISCHEMIC LEFT MCA STROKE: ICD-10-CM

## 2020-02-13 DIAGNOSIS — E78.1 HYPERTRIGLYCERIDEMIA: Primary | ICD-10-CM

## 2020-02-13 DIAGNOSIS — Z23 NEED FOR PROPHYLACTIC VACCINATION AND INOCULATION AGAINST INFLUENZA: ICD-10-CM

## 2020-02-13 PROCEDURE — 90686 IIV4 VACC NO PRSV 0.5 ML IM: CPT | Performed by: PHYSICIAN ASSISTANT

## 2020-02-13 PROCEDURE — 90471 IMMUNIZATION ADMIN: CPT | Performed by: PHYSICIAN ASSISTANT

## 2020-02-13 PROCEDURE — 99214 OFFICE O/P EST MOD 30 MIN: CPT | Mod: 25 | Performed by: PHYSICIAN ASSISTANT

## 2020-02-13 RX ORDER — AMLODIPINE BESYLATE 5 MG/1
5 TABLET ORAL DAILY
Qty: 90 TABLET | Refills: 1 | Status: SHIPPED | OUTPATIENT
Start: 2020-02-13 | End: 2020-06-03

## 2020-02-13 RX ORDER — FENOFIBRATE 160 MG/1
160 TABLET ORAL DAILY
Qty: 90 TABLET | Refills: 1 | Status: SHIPPED | OUTPATIENT
Start: 2020-02-13 | End: 2020-06-03

## 2020-02-13 RX ORDER — BUPROPION HYDROCHLORIDE 150 MG/1
150 TABLET ORAL EVERY MORNING
Qty: 30 TABLET | Refills: 0 | Status: SHIPPED | OUTPATIENT
Start: 2020-02-13 | End: 2020-03-19

## 2020-02-13 ASSESSMENT — MIFFLIN-ST. JEOR: SCORE: 1210.24

## 2020-02-13 NOTE — PATIENT INSTRUCTIONS
Restart medications    Schedule lab only in ~4 weeks (after on meds for ~1 month) - can schedule at any [facility] (fasting - 8 hours - black coffee/water are fine).    Check into mail order pharmacy preference for insurance and send info to me (send address or fax number to narrow down).

## 2020-03-19 DIAGNOSIS — F90.9 ATTENTION DEFICIT HYPERACTIVITY DISORDER (ADHD), UNSPECIFIED ADHD TYPE: ICD-10-CM

## 2020-03-19 RX ORDER — BUPROPION HYDROCHLORIDE 150 MG/1
TABLET ORAL
Qty: 90 TABLET | Refills: 0 | Status: SHIPPED | OUTPATIENT
Start: 2020-03-19 | End: 2020-06-03

## 2020-03-19 NOTE — TELEPHONE ENCOUNTER
"Requested Prescriptions   Pending Prescriptions Disp Refills     buPROPion (WELLBUTRIN XL) 150 MG 24 hr tablet [Pharmacy Med Name: buPROPion HCl ER (XL) Oral Tablet Extended Release 24 Hour 150 MG] 30 tablet 0     Sig: TAKE ONE TABLET BY MOUTH EVERY MORNING   Last Written Prescription Date:  2/13/20  Last Fill Quantity: 30,  # refills: 0   Last office visit: 2/13/2020 with prescribing provider:  Kevin   Future Office Visit:        SSRIs Protocol Passed - 3/19/2020 12:18 PM   No flowsheet data found.       Passed - Recent (12 mo) or future (30 days) visit within the authorizing provider's specialty     Patient has had an office visit with the authorizing provider or a provider within the authorizing providers department within the previous 12 mos or has a future within next 30 days. See \"Patient Info\" tab in inbasket, or \"Choose Columns\" in Meds & Orders section of the refill encounter.              Passed - Medication is Bupropion     If the medication is Bupropion (Wellbutrin), and the patient is taking for smoking cessation; OK to refill.          Passed - Medication is active on med list        Passed - Patient is age 18 or older        Passed - No active pregnancy on record        Passed - No positive pregnancy test in last 12 months             "

## 2020-06-03 DIAGNOSIS — F90.9 ATTENTION DEFICIT HYPERACTIVITY DISORDER (ADHD), UNSPECIFIED ADHD TYPE: ICD-10-CM

## 2020-06-03 DIAGNOSIS — E78.1 HYPERTRIGLYCERIDEMIA: ICD-10-CM

## 2020-06-03 DIAGNOSIS — R03.0 ELEVATED BP WITHOUT DIAGNOSIS OF HYPERTENSION: ICD-10-CM

## 2020-06-03 RX ORDER — BUPROPION HYDROCHLORIDE 150 MG/1
150 TABLET ORAL EVERY MORNING
Qty: 90 TABLET | Refills: 0 | Status: SHIPPED | OUTPATIENT
Start: 2020-06-03 | End: 2020-10-22

## 2020-06-03 RX ORDER — FENOFIBRATE 160 MG/1
160 TABLET ORAL DAILY
Qty: 90 TABLET | Refills: 0 | Status: SHIPPED | OUTPATIENT
Start: 2020-06-03 | End: 2020-10-22

## 2020-06-03 RX ORDER — AMLODIPINE BESYLATE 5 MG/1
5 TABLET ORAL DAILY
Qty: 90 TABLET | Refills: 0 | Status: SHIPPED | OUTPATIENT
Start: 2020-06-03 | End: 2020-10-22

## 2020-06-03 NOTE — TELEPHONE ENCOUNTER
Reason for Call:  Medication or medication refill:    Do you use a [facility]?  Name of the pharmacy and phone number for the current request:  [address]    Name of the medication requested: amLODIPine (NORVASC) 5 MG tablet    buPROPion (WELLBUTRIN XL) 150 MG 24 hr tablet    fenofibrate (TRIGLIDE/LOFIBRA) 160 MG tablet    Other request: n/a    Can we leave a detailed message on this number? YES    Phone number patient can be reached at: Cell number on file:    Telephone Information:   Mobile [phone]       Best Time: any    Call taken on 6/3/2020 at 2:15 PM by Sandra Lares

## 2020-10-21 DIAGNOSIS — F90.9 ATTENTION DEFICIT HYPERACTIVITY DISORDER (ADHD), UNSPECIFIED ADHD TYPE: ICD-10-CM

## 2020-10-21 DIAGNOSIS — R03.0 ELEVATED BP WITHOUT DIAGNOSIS OF HYPERTENSION: ICD-10-CM

## 2020-10-21 NOTE — LETTER
[address] [address]  [phone]       November 2, 2020    Mariela Rodriguez  [address]   [place] [address]    Mariela:    We have been calling you regarding a recent refill request we received for wellbutrin and amlodipine.  Unfortunately, we were unable to reach you.  We are notifying you that you are due for a fasting physical with labs  prior to your next refill.  You can schedule this appointment via [employer] or by calling the clinic at [phone].        Sincerely,    Susan Brown PA-C / tirso

## 2020-10-22 DIAGNOSIS — E78.1 HYPERTRIGLYCERIDEMIA: ICD-10-CM

## 2020-10-22 RX ORDER — AMLODIPINE BESYLATE 5 MG/1
TABLET ORAL
Qty: 30 TABLET | Refills: 0 | Status: SHIPPED | OUTPATIENT
Start: 2020-10-22 | End: 2021-08-04

## 2020-10-22 RX ORDER — BUPROPION HYDROCHLORIDE 150 MG/1
150 TABLET ORAL EVERY MORNING
Qty: 90 TABLET | Refills: 0 | Status: SHIPPED | OUTPATIENT
Start: 2020-10-22 | End: 2021-08-04

## 2020-10-22 RX ORDER — FENOFIBRATE 160 MG/1
160 TABLET ORAL DAILY
Qty: 30 TABLET | Refills: 0 | Status: SHIPPED | OUTPATIENT
Start: 2020-10-22 | End: 2021-08-04

## 2020-10-22 NOTE — TELEPHONE ENCOUNTER
30-day refill sent to pharmacy.  Advised patient that she is due for fasting physical with labs       Susan Brown MBA, MS, PA-C

## 2020-10-22 NOTE — TELEPHONE ENCOUNTER
Routing refill request to provider for review/approval because:  Labs not current:  Jennifer

## 2020-11-02 NOTE — TELEPHONE ENCOUNTER
Left non-detailed message for patient to call back.  Please schedule physical  when patient calls back.  (see previous notes for details)    I have been unable to reach this patient by phone.  A letter is being sent to the last known home address.    Thanks Tomeka

## 2020-12-21 ENCOUNTER — OFFICE VISIT - HEALTHEAST (OUTPATIENT)
Dept: BEHAVIORAL HEALTH | Facility: CLINIC | Age: 50
End: 2020-12-21

## 2020-12-21 DIAGNOSIS — F90.9 ATTENTION DEFICIT HYPERACTIVITY DISORDER (ADHD), UNSPECIFIED ADHD TYPE: ICD-10-CM

## 2021-01-15 ENCOUNTER — HEALTH MAINTENANCE LETTER (OUTPATIENT)
Age: 51
End: 2021-01-15

## 2021-01-20 ENCOUNTER — COMMUNICATION - HEALTHEAST (OUTPATIENT)
Dept: BEHAVIORAL HEALTH | Facility: CLINIC | Age: 51
End: 2021-01-20

## 2021-01-23 ENCOUNTER — HEALTH MAINTENANCE LETTER (OUTPATIENT)
Age: 51
End: 2021-01-23

## 2021-02-03 ENCOUNTER — OFFICE VISIT - HEALTHEAST (OUTPATIENT)
Dept: BEHAVIORAL HEALTH | Facility: CLINIC | Age: 51
End: 2021-02-03

## 2021-02-03 DIAGNOSIS — F90.9 ATTENTION DEFICIT HYPERACTIVITY DISORDER (ADHD), UNSPECIFIED ADHD TYPE: ICD-10-CM

## 2021-03-17 ENCOUNTER — TRANSFERRED RECORDS (OUTPATIENT)
Dept: HEALTH INFORMATION MANAGEMENT | Facility: CLINIC | Age: 51
End: 2021-03-17

## 2021-03-17 ENCOUNTER — OFFICE VISIT - HEALTHEAST (OUTPATIENT)
Dept: BEHAVIORAL HEALTH | Facility: CLINIC | Age: 51
End: 2021-03-17

## 2021-03-17 DIAGNOSIS — F90.9 ATTENTION DEFICIT HYPERACTIVITY DISORDER (ADHD), UNSPECIFIED ADHD TYPE: ICD-10-CM

## 2021-03-20 ENCOUNTER — IMMUNIZATION (OUTPATIENT)
Dept: NURSING | Facility: CLINIC | Age: 51
End: 2021-03-20
Payer: COMMERCIAL

## 2021-03-20 PROCEDURE — 0001A PR COVID VAC PFIZER DIL RECON 30 MCG/0.3 ML IM: CPT

## 2021-03-20 PROCEDURE — 91300 PR COVID VAC PFIZER DIL RECON 30 MCG/0.3 ML IM: CPT

## 2021-04-07 ENCOUNTER — COMMUNICATION - HEALTHEAST (OUTPATIENT)
Dept: BEHAVIORAL HEALTH | Facility: CLINIC | Age: 51
End: 2021-04-07

## 2021-04-10 ENCOUNTER — IMMUNIZATION (OUTPATIENT)
Dept: NURSING | Facility: CLINIC | Age: 51
End: 2021-04-10
Payer: COMMERCIAL

## 2021-04-10 PROCEDURE — 91300 PR COVID VAC PFIZER DIL RECON 30 MCG/0.3 ML IM: CPT

## 2021-04-10 PROCEDURE — 0002A PR COVID VAC PFIZER DIL RECON 30 MCG/0.3 ML IM: CPT

## 2021-06-13 NOTE — PROGRESS NOTES
This video/telephone visit will be conducted via a call between you and your physician/provider. We have found that certain health care needs can be provided without the need for an in-person physical exam. This service lets us provide the care you need with a video /telephone conversation. If a prescription is necessary we can send it directly to your pharmacy. If lab work is needed we can place an order for that and you can then stop by our lab to have the test done at a later time.    Just as we bill insurance for in-person visits, we also bill insurance for video/telephone visits. If you have questions about your insurance coverage, we recommend that you speak with your insurance company.    Patient has given verbal consent for video/Telephone visit? Yes  Patient would like telephone visit, please call:  [phone]  JORGE/SHIRIN LEE CMA    Patient verified allergies, medications and pharmacy via phone.  Patient states she is ready for visit.    Unable to review [place] , awaiting access from provider.

## 2021-06-13 NOTE — PROGRESS NOTES
Initial Outpatient Psychiatry Consult Note     The patient was seen on December 21 of 2020 for an initial psychiatric intake evaluation.  This was done by phone due to the coronavirus restrictions.  The patient has a history of moyamoya disease with a ischemic left MCA stroke occurring in December 2017.  She is followed through neurosurgery at [facility] and recently started treatment for underlying hyperlipidemia, starting fenofibrate in October 2019 due to her hypertriglyceridemia.  Neurosurgery reports her last imaging was in June 2019 and she was described as stable.  She reports to me that she has been medically cleared by neurosurgery to restart stimulant medication if we deem that appropriate.  She presents to this clinic with a longstanding ADHD.  At baseline she apparently struggles with some cognitive communication difficulties and some expressive aphasia related to the prior left MCA stroke.  This was diagnosed in fifth grade and she was on stimulants until eighth grade when she stopped the medication because she did not like being on medication.  She states she was put back on Adderall from 2010 until 2018 and that was extremely helpful with her underlying ADHD.  She reports that she also had been on Wellbutrin but was taken off that a few months ago because she was not sure it was helpful.  In hindsight she states her attention is gotten worse since the Wellbutrin has been discontinued.  She has had no prior psychiatric hospitalizations.  She is never had any suicidality.  No history of any psychosis or eating disorder symptoms.  No significant anxiety.  She denies having any significant depressive episodes but presents today requesting consideration for return to the stimulant medication as suggested by her neurosurgeon office due to the fact that she is struggling professionally as a  due to her ADD symptoms.    HPI:  Please see above.  The patient presents today for  consideration for restarting medication due to her underlying attention deficit disorder symptoms.  She presents today denying any history of significant depression.  She states she is never had any suicidality.  She is never had any suicide attempts.  No history of any psychosis.  She reports chronic difficulties with concentration and focus and this becomes worse if she does feel overwhelmed at work.  She has attempted techniques to try and stay focused but is having a harder time and believes that this is gotten worse since discontinuing the Wellbutrin a couple of months ago.    She reports she sleeps fairly well but admits that time she does not sleep because she becomes anxious about work and is worried she may lose her job due to her difficulties keeping up.  She denies any headaches or dizziness.  She reports she has no other new medical diagnoses or treatments.  She is also considering restarting therapy.  Her prior therapist apparently retired a couple of years ago.  She denies having any other questions or concerns.    Current Medications:  Medications were personally reviewed at this meeting.  Please see the chart for current medication list.         Past medical History:  Patient Active Problem List   Diagnosis     Attention deficit hyperactivity disorder (ADHD), unspecified ADHD type     Cognitive communication deficit     Expressive aphasia     Hx of ischemic left MCA stroke - 12/2017 - due to Moyamoya disease     Iron deficiency anemia     Tolbert tolbert disease     S/P craniotomy     Thyroid nodule - FNA consistent with benign process 9/2019, repeat ultrasound 10/2020     Vitamin D deficiency     Hypertriglyceridemia     Elevated BP without diagnosis of hypertension     MAGEN II (cervical intraepithelial neoplasia II)   Past Surgical History:   Procedure Laterality Date     C INTRA-EXTRACRANIAL ART ANASTOM 01/11/2018   left extracranial intercranial bypass supine position     extracranial bypass 05/24/2018    right extracranial intracranial bypass     TONSILLECTOMY     [facility]       Past Psychiatric History:  Please see above.  The patient has never been psychiatrically hospitalized.  No history of any suicidality.  She denies having any history of psychosis.  She is never had any significant depressive episodes but has struggled with situational depressive symptoms on a mild basis at multiple times through her life.  As previously stated the patient was on stimulants, she believes Ritalin, from the fifth grade to the eighth grade which was somewhat helpful but she stopped the medication because she did not want to take medication.  She has been off Wellbutrin for 2 months but had been on that I believe for a number of years and found this in hindsight to be helpful.  She was treated at a dose of 150 mg a day.  She was also on Strattera for a couple of months in the past but did not like how that made her feel.  She believes that she has had Adderall and Ritalin in the past which both been helpful.  She is apparently been off all stimulants since 2018.      Substance Abuse History:  No history of any alcohol abuse, illicit drug use or prescription medication misuse.  She has never been to detox and has never had a DWI.      Family History:  Patient is adopted.      Social History:  Patient was born and raised in [place].  She has a sister who is the biological daughter of her parents.  The patient graduated from high school but was in special classes for learning disabilities throughout her high school years.  She recalls being in classes for English and social studies and also attended summer school to get remedial help.  She has never been .  No children.  Currently not involved in a romantic relationship.  She has been working at [employer] for the last 8 years or so and apparently has been working in the [employer] business for 20 years.  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  She describes chronically having difficulty with attentional issues and attention to detail which have been helped by the use of medications including stimulants and Wellbutrin in the past as well as making lifestyle changes to avoid having to excessively multitask.  No history of any abuse.             Review Of Systems:  Patient denies having any current feelings of illness or any pain.  Please see above.         Mental Status Exam:  Appearance: Patient was interviewed by phone.  Behavior: Patient was cooperative, pleasant, friendly and participated well.  No avoidant behavior was noted.  She was in no distress.  Speech: Patient was talkative and occasionally needed some redirection but was able to provide adequate history.  Not pressured.  Mood/Affect: No obvious depression or anxiety.  No irritability.  No symptoms of heather.  Thought Content: No hallucinations or delusions  Suicidal or Homicidal Thoughts: None apparent or reported  Thought Process/Formulation: Patient describes chronic difficulty with distractibility but was able to stay on task pretty well.  She did need occasional redirection and prompts.  Occasionally she needed to be reorganized.  Associations: Adequate  Fund of Knowledge: Adequate  Attention/Concentration: Patient chronically has had difficulty with attention and distractibility but was able to participate in the interview with occasional prompts and some mild redirection  Insight: Grossly adequate  Judgement: Grossly intact  Memory: Grossly intact  Motor Status: Patient denies having any new tremors or asymmetries.  Fund of Knowledge: Grossly intact  Orientation: Grossly oriented        Diagnosis:    Attention deficit disorder, NOS, by history    Rule out cognitive disorder NOS secondary to prior CVA related to moyamoya disease    The patient was interviewed for 1 hour today.    Plan:  I recommend that I follow the patient from a psychiatric standpoint.  I  will assess for the appropriateness of possible psychotropic medication trials/changes.  At this time I am going to restart the patient on Wellbutrin XR at 150 mg a day.  She has been on this medication in the past and found it to be simply somewhat helpful.  She achieved a dose of 150 mg a day in the past and is willing to restart that medication.  We are considering restarting a stimulant.  She has been cleared by her neurosurgery office and would like us to consider that option.  Risks and benefits were again discussed with the patient and we may restart either Adderall or Ritalin at the follow-up appointment in 4 weeks after she has been restarted on the Wellbutrin.    We will consider additional neuropsych testing in the future but at this time I want to see how she does with the medication changes as described above.    We will also consider referring the patient back to an individual psychologist as her prior psychologist retired a few years ago.    The patient agrees to call with any questions or concerns.  The patient will seek out appropriate emergency services should that become necessary.  I will make myself available if any questions, concerns, or problems arise.       Fabricio Euceda MD

## 2021-06-13 NOTE — PATIENT INSTRUCTIONS - HE
I have restarted the patient on Wellbutrin XR at 150 mg a day for 2 weeks then increase to 300 mg a day.  We are considering restarting a stimulant, possibly Adderall or Ritalin which she has been on in the past with some success.  We will readdress that at the next visit.  Patient will return to this clinic for medication check in 4 weeks.  She agrees to call before then or return with any questions or concerns.

## 2021-06-13 NOTE — PROGRESS NOTES
________________________________________  Medications Phoned  to Pharmacy [] yes [x]no  Name of Pharmacist:  List Medications, including dose, quantity and instructions    Medications ordered this visit were e-scribed.  Verified by order class [x] yes  [] no  Bupropion 150 mg    Medication changes or discontinuations were communicated to patient's pharmacy: [] yes  [x] no    Dictation completed at time of chart check: [x] yes  [] no    I have checked the documentation for today s encounters and the above information has been reviewed and completed.

## 2021-06-14 NOTE — TELEPHONE ENCOUNTER
Writer tried calling multiple times with no answer.    Left messages for Patient to call and schedule again. Scheduling number was also given in messages left.       Provider was notified.

## 2021-06-14 NOTE — PROGRESS NOTES
This video/telephone visit will be conducted via a call between you and your physician/provider. We have found that certain health care needs can be provided without the need for an in-person physical exam. This service lets us provide the care you need with a video /telephone conversation. If a prescription is necessary we can send it directly to your pharmacy. If lab work is needed we can place an order for that and you can then stop by our lab to have the test done at a later time.    Just as we bill insurance for in-person visits, we also bill insurance for video/telephone visits. If you have questions about your insurance coverage, we recommend that you speak with your insurance company.    Patient has given verbal consent for video/Telephone visit? Yes  Patient would like telephone visit, please call:  [phone]   JORGE/SHIRIN LEE CMA    Patient verified allergies, medications and pharmacy via phone.  Patient states she is ready for visit.    Unable to review [place] , awaiting access from provider.

## 2021-06-15 NOTE — PROGRESS NOTES
Outpatient Psychiatry Progress note     The patient was seen on December 21 of 2020 for an initial psychiatric intake evaluation.  This was done by phone due to the coronavirus restrictions.  The patient has a history of moyamoya disease with a ischemic left MCA stroke occurring in December 2017.  She is followed through neurosurgery at [facility] and recently started treatment for underlying hyperlipidemia, starting fenofibrate in October 2019 due to her hypertriglyceridemia.  Neurosurgery reports her last imaging was in June 2019 and she was described as stable.  She reports to me that she has been medically cleared by neurosurgery to restart stimulant medication if we deem that appropriate.  She presents to this clinic with a longstanding ADHD.  At baseline she apparently struggles with some cognitive communication difficulties and some expressive aphasia related to the prior left MCA stroke.  This was diagnosed in fifth grade and she was on stimulants until eighth grade when she stopped the medication because she did not like being on medication.  She states she was put back on Adderall from 2010 until 2018 and that was extremely helpful with her underlying ADHD.  She reports that she also had been on Wellbutrin but was taken off that a few months ago because she was not sure it was helpful.  In hindsight she states her attention is gotten worse since the Wellbutrin has been discontinued.  She has had no prior psychiatric hospitalizations.  She is never had any suicidality.  No history of any psychosis or eating disorder symptoms.  No significant anxiety.  She denies having any significant depressive episodes but presents today requesting consideration for return to the stimulant medication as suggested by her neurosurgeon office due to the fact that she is struggling professionally as a  due to her ADD symptoms.    I saw the patient for a virtual visit for the intake on December 21 of  2020.  We restarted her Wellbutrin at that visit it had been effective for her in the past and in hindsight after stopping the medication she felt it was quite beneficial.  We did consider restarting a stimulant at that visit.    HPI:  Patient states she has improved after starting the Wellbutrin and she wanted to know if we could consider further increase.  She reports that her mood is good.  She has no depression and no anxiety.  No desire to be dead and no thoughts of suicide.  She reports no psychotic symptoms.  She is sleeping well and eating well.  No new diagnoses or treatments.  No seizures.  She is never had seizures.    On my interview with the patient she states she is quite pleased with the improvement and would like to consider an increase in the medication.  Risks and benefits were discussed including the risk of seizure.  The patient reports that she has tolerated the medication quite well and is willing to increase the medication but change to twice a day dosing.  She reports no other questions or concerns.  No other problems.    Current Medications:  Medications were personally reviewed at this meeting.  Please see the chart for current medication list.         Past medical History:  Patient Active Problem List   Diagnosis     Attention deficit hyperactivity disorder (ADHD), unspecified ADHD type     Cognitive communication deficit     Expressive aphasia     Hx of ischemic left MCA stroke - 12/2017 - due to Moyamoya disease     Iron deficiency anemia     Tolbert tolebrt disease     S/P craniotomy     Thyroid nodule - FNA consistent with benign process 9/2019, repeat ultrasound 10/2020     Vitamin D deficiency     Hypertriglyceridemia     Elevated BP without diagnosis of hypertension     MAGEN II (cervical intraepithelial neoplasia II)   Past Surgical History:   Procedure Laterality Date     C INTRA-EXTRACRANIAL ART ANASTOM 01/11/2018   left extracranial intercranial bypass supine position     extracranial  bypass 05/24/2018   right extracranial intracranial bypass     TONSILLECTOMY     [facility]            Mental Status Exam:  Appearance: Patient was interviewed by phone.  Behavior: Patient was bright and pleasant.  No irritability.  She was cooperative and pleasant.  No reports of any behavioral difficulties.  Speech: Sentence structure was intact.  She was articulate.  Able to initiate.  No pressured or rambling quality.  Mood/Affect: No obvious depression or anxiety.  No irritability.  No symptoms of heather.  She was bright and happy.  Thought Content: No hallucinations or delusions  Suicidal or Homicidal Thoughts: None apparent or reported  Thought Process/Formulation: Patient describes chronic difficulty with distractibility but was able to stay on task pretty well.  She seemed to be focused and follow conversation quite well.  She did not seem to need any prompts or reorganization today.  Associations: Adequate  Fund of Knowledge: Adequate  Attention/Concentration: Patient chronically has had difficulty with attention and distractibility but was able to participate in the interview with occasional prompts and some mild redirection  Insight: Grossly adequate  Judgement: Grossly intact  Memory: Grossly intact  Motor Status: Patient denies having any new tremors or asymmetries.  Fund of Knowledge: Grossly intact  Orientation: Grossly oriented        Diagnosis:    Attention deficit disorder, NOS, by history    Rule out cognitive disorder NOS secondary to prior CVA related to moyamoya disease    The patient interview, chart review and documentation took 35 minutes.    Plan:  I am going to increase the patient's Wellbutrin XL to 300 mg in the morning and 150 mg in the afternoon.  Risks and benefits were discussed including the risk of seizure.  Patient agrees to call with any questions or concerns.    We will consider additional neuropsych testing in the future but at this time I want to see how she does with  the medication changes as described above.    We will also consider referring the patient back to an individual psychologist as her prior psychologist retired a few years ago.  At this point the patient is comfortable with the current plan and does not want any other changes.    The patient agrees to call with any questions or concerns.  The patient will seek out appropriate emergency services should that become necessary.  I will make myself available if any questions, concerns, or problems arise.       Fabricio Euceda MD

## 2021-06-15 NOTE — PATIENT INSTRUCTIONS - HE
I have increased the patient's Wellbutrin XL to 300 mg in the morning and 450 mg in the afternoon.  Risks and benefits were discussed with the patient.  She is considering restarting individual therapy and we will likely schedule the patient for neuropsychology in the future but I would like to see her on this medication for a bit longer.  She will return to this clinic in 6 to 8 weeks for a medication check.  She agrees to call before then with any questions or concerns.

## 2021-06-16 NOTE — PATIENT INSTRUCTIONS - HE
Patient is doing well and is improved with the recent medication adjustments.  She is tolerating the medication.  We will continue with the medications as ordered at this time.  She will return to this clinic in 3 months for medication check and agrees to call before then with any questions or concerns.

## 2021-06-16 NOTE — TELEPHONE ENCOUNTER
Mariela called with questions about the dosage of a medication.  She says this month she was only given 30 pills and she has run out already.  Would like to speak with someone to clarify dosage instructions.  [phone]

## 2021-06-16 NOTE — PROGRESS NOTES
This video/telephone visit will be conducted via a call between you and your physician/provider. We have found that certain health care needs can be provided without the need for an in-person physical exam. This service lets us provide the care you need with a video /telephone conversation. If a prescription is necessary we can send it directly to your pharmacy. If lab work is needed we can place an order for that and you can then stop by our lab to have the test done at a later time.    Just as we bill insurance for in-person visits, we also bill insurance for video/telephone visits. If you have questions about your insurance coverage, we recommend that you speak with your insurance company.    Patient has given verbal consent for video/Telephone visit? yes  Patient would like telephone visit, please call: [phone]  JORGE/SHIRIN : Willie GABRIEL LPN   Situational sadness due to COVID - otherwise doing good.   Patient verified allergies, medications and pharmacy via phone.  Patient states she  is ready for visit.    : NA    ________________________________________  Medications Phoned  to Pharmacy [] yes [x]no  Name of Pharmacist:  List Medications, including dose, quantity and instructions    Medications ordered this visit were e-scribed.  Verified by order class [x] yes  [] no  Wellburtin  mg  Medication changes or discontinuations were communicated to patient's pharmacy: [] yes  [x] no    Dictation completed at time of chart check: [x] yes  [] no    I have checked the documentation for today s encounters and the above information has been reviewed and completed.

## 2021-06-16 NOTE — TELEPHONE ENCOUNTER
Phone call to pharmacy, spoke with Mary Lou. She did see that a month supply of the bupropion would require 90 tablets.  She will correct that for Mariela.    Phone call to Mariela.  Told her I spoke with Mary Lou at the pharmacy and the pharmacy would fix the issue.

## 2021-06-16 NOTE — TELEPHONE ENCOUNTER
Returned call to patient. Patient said that she was just checking to be sure that the prescription was for 90 tablets because her pharmacy only dispensed 30. Confirmed with patient that the prescription was for 90 tables and to follow up with her pharmacy.

## 2021-06-16 NOTE — PROGRESS NOTES
Outpatient Psychiatry Progress note     The patient was seen on December 21 of 2020 for an initial psychiatric intake evaluation.  This was done by phone due to the coronavirus restrictions.  The patient has a history of moyamoya disease with a ischemic left MCA stroke occurring in December 2017.  She is followed through neurosurgery at [facility] and recently started treatment for underlying hyperlipidemia, starting fenofibrate in October 2019 due to her hypertriglyceridemia.  Neurosurgery reports her last imaging was in June 2019 and she was described as stable.  She reports to me that she has been medically cleared by neurosurgery to restart stimulant medication if we deem that appropriate.  She presents to this clinic with a longstanding ADHD.  At baseline she apparently struggles with some cognitive communication difficulties and some expressive aphasia related to the prior left MCA stroke.  This was diagnosed in fifth grade and she was on stimulants until eighth grade when she stopped the medication because she did not like being on medication.  She states she was put back on Adderall from 2010 until 2018 and that was extremely helpful with her underlying ADHD.  She reports that she also had been on Wellbutrin but was taken off that a few months ago because she was not sure it was helpful.  In hindsight she states her attention is gotten worse since the Wellbutrin has been discontinued.  She has had no prior psychiatric hospitalizations.  She is never had any suicidality.  No history of any psychosis or eating disorder symptoms.  No significant anxiety.  She denies having any significant depressive episodes but presents today requesting consideration for return to the stimulant medication as suggested by her neurosurgeon office due to the fact that she is struggling professionally as a  due to her ADD symptoms.    I saw the patient for a virtual visit for the intake on December 21 of  2020.  We restarted her Wellbutrin at that visit it had been effective for her in the past and in hindsight after stopping the medication she felt it was quite beneficial.  We did consider restarting a stimulant at that visit.    The patient for a clinic visit on February 3 of 2021.  She reported she had improved somewhat after starting the Wellbutrin and was requesting another increase.      HPI:  The patient was interviewed by phone today.  She was bright and participated well.  She told me she was doing extremely well and felt the recent increase in the medication had been very helpful.  She stated she was tolerating it without any side effects.  She has been monitoring her blood pressure and heart rate and they have remained normal.  She has had no evidence of any seizure activity and we did again discuss the risk of that.  She stated she understood that and wanted to continue with the medication because she found it to be so helpful.    She told me she was sleeping well and eating well.  Her concentration has improved.  She denies having any desire to be dead or thoughts of suicide.  She has had no psychotic symptoms.  No new medical issues or concerns and no side effects to the medication.  She recently returned from a trip to [place] and had a good time there.  She states she has been more social and is quite pleased with how well she is feeling.    Current Medications:  Medications were personally reviewed at this meeting.  Please see the chart for current medication list.         Past medical History:  Patient Active Problem List   Diagnosis     Attention deficit hyperactivity disorder (ADHD), unspecified ADHD type     Cognitive communication deficit     Expressive aphasia     Hx of ischemic left MCA stroke - 12/2017 - due to Moyamoya disease     Iron deficiency anemia     Tolbert tolbert disease     S/P craniotomy     Thyroid nodule - FNA consistent with benign process 9/2019, repeat ultrasound 10/2020      Vitamin D deficiency     Hypertriglyceridemia     Elevated BP without diagnosis of hypertension     MAGEN II (cervical intraepithelial neoplasia II)   Past Surgical History:   Procedure Laterality Date     C INTRA-EXTRACRANIAL ART ANASTOM 01/11/2018   left extracranial intercranial bypass supine position     extracranial bypass 05/24/2018   right extracranial intracranial bypass     TONSILLECTOMY     [facility]            Mental Status Exam:  Appearance: Patient was interviewed by phone.  Behavior: Pleasant and polite.  Cooperative and pleasant.  No irritability or agitation.  Speech: Sentence structure was intact.  She was articulate.  Able to initiate.  No pressured or rambling quality.  She was able to participate appropriately.  She was tracking very well.  Mood/Affect: No obvious depression or anxiety.  She was bright and happy.  No irritability.  No symptoms of heather.  No agitation.  No evidence of any heather.  Thought Content: No hallucinations or delusions  Suicidal or Homicidal Thoughts: None apparent or reported  Thought Process/Formulation: Tracking and following the conversation quite well.  Not disorganized.  No evidence of any rapid thoughts.  Associations: Adequate  Fund of Knowledge: Adequate  Attention/Concentration: Patient chronically has had difficulty with attention and distractibility but was able to participate in the interview with occasional prompts and some mild redirection  Insight: Grossly adequate  Judgement: Grossly intact  Memory: Grossly intact  Motor Status: Patient denies having any new tremors or asymmetries.  Fund of Knowledge: Grossly intact  Orientation: Grossly oriented        Diagnosis:    Attention deficit disorder, NOS, by history    Rule out cognitive disorder NOS secondary to prior CVA related to moyamoya disease    The patient interview, chart review and documentation took 25 minutes.    Plan:  We will continue with the current medication regime.    At this point we  will continue with the current treatment plan.  We are considering neuro neuropsych testing in the future but at this point the patient seems to be doing well.      The patient agrees to call with any questions or concerns.  The patient will seek out appropriate emergency services should that become necessary.  I will make myself available if any questions, concerns, or problems arise.       Fabricio Euceda MD

## 2021-08-04 ENCOUNTER — VIRTUAL VISIT (OUTPATIENT)
Dept: BEHAVIORAL HEALTH | Facility: CLINIC | Age: 51
End: 2021-08-04
Payer: COMMERCIAL

## 2021-08-04 DIAGNOSIS — F90.9 ATTENTION DEFICIT HYPERACTIVITY DISORDER (ADHD), UNSPECIFIED ADHD TYPE: ICD-10-CM

## 2021-08-04 PROCEDURE — 99213 OFFICE O/P EST LOW 20 MIN: CPT | Mod: TEL | Performed by: PSYCHIATRY & NEUROLOGY

## 2021-08-04 RX ORDER — DEXTROAMPHETAMINE/AMPHETAMINE 10 MG
10 CAPSULE, EXT RELEASE 24 HR ORAL DAILY
COMMUNITY
Start: 2021-06-21 | End: 2021-08-04

## 2021-08-04 RX ORDER — BUPROPION HYDROCHLORIDE 150 MG/1
TABLET ORAL
Qty: 90 TABLET | Refills: 2 | Status: SHIPPED | OUTPATIENT
Start: 2021-08-04 | End: 2021-11-02

## 2021-08-04 RX ORDER — DEXTROAMPHETAMINE/AMPHETAMINE 10 MG
10 CAPSULE, EXT RELEASE 24 HR ORAL DAILY
Qty: 30 CAPSULE | Refills: 0 | Status: SHIPPED | OUTPATIENT
Start: 2021-08-04 | End: 2021-09-20

## 2021-08-04 NOTE — PROGRESS NOTES
This video/telephone visit will be conducted via a call between you and your physician/provider. We have found that certain health care needs can be provided without the need for an in-person physical exam. This service lets us provide the care you need with a video /telephone conversation. If a prescription is necessary we can send it directly to your pharmacy. If lab work is needed we can place an order for that and you can then stop by our lab to have the test done at a later time.    Just as we bill insurance for in-person visits, we also bill insurance for video/telephone visits. If you have questions about your insurance coverage, we recommend that you speak with your insurance company.    Patient has given verbal consent for Telephone visit? Yes   Patient would like telephone visit please call: [phone]  [facility]IRIN ESP[facility]   Medication refill is pended for review and approval by provider.  No new concerns   Patient verified allergies, medications and pharmacy via phone. Patient states she is ready for visit.  [place]  to be reviewed by provider.

## 2021-08-04 NOTE — PROGRESS NOTES
Outpatient Psychiatry Progress note     The patient was seen on December 21 of 2020 for an initial psychiatric intake evaluation.  This was done by phone due to the coronavirus restrictions.  The patient has a history of moyamoya disease with a ischemic left MCA stroke occurring in December 2017.  She is followed through neurosurgery at [facility] and recently started treatment for underlying hyperlipidemia, starting fenofibrate in October 2019 due to her hypertriglyceridemia.  Neurosurgery reports her last imaging was in June 2019 and she was described as stable.  She reports to me that she has been medically cleared by neurosurgery to restart stimulant medication if we deem that appropriate.  She presents to this clinic with a longstanding ADHD.  At baseline she apparently struggles with some cognitive communication difficulties and some expressive aphasia related to the prior left MCA stroke.  This was diagnosed in fifth grade and she was on stimulants until eighth grade when she stopped the medication because she did not like being on medication.  She states she was put back on Adderall from 2010 until 2018 and that was extremely helpful with her underlying ADHD.  She reports that she also had been on Wellbutrin but was taken off that a few months ago because she was not sure it was helpful.  In hindsight she states her attention is gotten worse since the Wellbutrin has been discontinued.  She has had no prior psychiatric hospitalizations.  She is never had any suicidality.  No history of any psychosis or eating disorder symptoms.  No significant anxiety.  She denies having any significant depressive episodes but presents today requesting consideration for return to the stimulant medication as suggested by her neurosurgeon office due to the fact that she is struggling professionally as a  due to her ADD symptoms.    I saw the patient for a virtual visit for the intake on December 21 of  2020.  We restarted her Wellbutrin at that visit it had been effective for her in the past and in hindsight after stopping the medication she felt it was quite beneficial.  We did consider restarting a stimulant at that visit.    The patient for a clinic visit on February 3 of 2021.  She reported she had improved somewhat after starting the Wellbutrin and was requesting another increase.    I saw the patient on March 17 of 2021.  She was doing well and participated well.  She denied side effects.  There was no problems with blood pressure or any other medical issues.  She was sleeping well and eating well and she stated her concentration had improved.  We did not make any changes at that time.    I saw the patient in June 2021.  She was doing fairly well at that time but had been taking an extra Wellbutrin and we did cut back on that so she took it as prescribed.  She was not using the Adderall at that time and felt like her concentration had suffered a bit.  We did restart that medication.    HPI:  The patient presented today for a phone interview with told me she was doing extremely well.  She stated work had gotten extremely busy but with the increase in the Adderall she was keeping up just fine.  She stated her mood was very good.  No depression or anxiety.  No desire to be dead or thoughts of harming herself.  She stated she was sleeping well most nights occasionally she did have some difficulty with sleep but that was not the norm.  No change in appetite.  No new medical issues or concerns.  She recently had a brain MRI that was reassuring according to the patient.  She is requesting no medication changes at this time and denied that she was having any side effects from the medication.  She was in agreement with the treatment plan.    Current Medications:  Medications were personally reviewed at this meeting.  Please see the chart for current medication list.         Past medical History:  Patient Active  Problem List   Diagnosis     Attention deficit hyperactivity disorder (ADHD), unspecified ADHD type     Cognitive communication deficit     Expressive aphasia     Hx of ischemic left MCA stroke - 12/2017 - due to Moyamoya disease     Iron deficiency anemia     Tolbert tolbert disease     S/P craniotomy     Thyroid nodule - FNA consistent with benign process 9/2019, repeat ultrasound 10/2020     Vitamin D deficiency     Hypertriglyceridemia     Elevated BP without diagnosis of hypertension     MAGEN II (cervical intraepithelial neoplasia II)   Past Surgical History:   Procedure Laterality Date     C INTRA-EXTRACRANIAL ART ANASTOM 01/11/2018   left extracranial intercranial bypass supine position     extracranial bypass 05/24/2018   right extracranial intracranial bypass     TONSILLECTOMY     [facility]            Mental Status Exam:  Appearance: Patient was interviewed by phone.  Behavior: Pleasant and cooperative with no reports of any behavioral dyscontrol.  Speech: Sentence structure was intact.  The patient was able to participate appropriately.  No loosening of association her answers were consistent and appropriate.  She seemed to be tracking a bit better today.  Mood/Affect: No depression or anxiety.  No irritability or heather.. She was bright and happy.  Thought Content: No hallucinations or delusions  Suicidal or Homicidal Thoughts: None apparent or reported  Thought Process/Formulation: Tracking and participating well.  Not loose.  Not disorganized.  Associations: Adequate  Fund of Knowledge: Adequate, no recent change.  Attention/Concentration: More attentive and tracking better.  Not disorganized.  No racing thoughts.  Insight: Grossly adequate  Judgement: Grossly intact  Memory: Grossly intact  Motor Status: Patient denies having any new tremors or asymmetries.  Fund of Knowledge: Grossly intact  Orientation: Grossly oriented        Diagnosis:    Attention deficit disorder, NOS, by history    Rule out  cognitive disorder NOS secondary to prior CVA related to moyamoya disease    The patient interview, chart review and documentation took 24 minutes.     Plan:  I will make no medication changes at this time.  The patient will continue on Wellbutrin at 300 mg in the morning and 150 mg midday as well as the Adderall extended release at 10 mg a day..    Patient will continue monitoring her blood pressure and heart rate.  She will call with any new medical issues or concerns.      The patient agrees to call with any questions or concerns.  The patient will seek out appropriate emergency services should that become necessary.  I will make myself available if any questions, concerns, or problems arise.       Fabricio Euceda MD

## 2021-08-04 NOTE — PROGRESS NOTES
________________________________________  Medications Phoned  to Pharmacy [x] yes []no  Name of Pharmacist: Tina Humphrey #[id number]  List Medications, including dose, quantity and instructions  Wellbutrin  mg: Qty: 90 Refills: 2 SIG: Take 2 tablets (300 mg) by mouth in the morning and take 1 tablet (150 mg) in the afternoon (total dose of 450 mg)    Ready back for accuracy     Medications ordered this visit were e-scribed.  Verified by order class [x] yes  [] no  Adderall XR 10 mg;     Medication changes or discontinuations were communicated to patient's pharmacy: [] yes  [x] no    Dictation completed at time of chart check: [x] yes  [] no    I have checked the documentation for today s encounters and the above information has been reviewed and completed.

## 2021-09-04 ENCOUNTER — HEALTH MAINTENANCE LETTER (OUTPATIENT)
Age: 51
End: 2021-09-04

## 2021-09-17 DIAGNOSIS — F90.9 ATTENTION DEFICIT HYPERACTIVITY DISORDER (ADHD), UNSPECIFIED ADHD TYPE: ICD-10-CM

## 2021-09-17 NOTE — TELEPHONE ENCOUNTER
Date of Last Office Visit: 8/4/21  Date of Next Office Visit: 11/3/21  No shows since last visit: 0  Cancellations since last visit: 0    Medication requested: Adderall XR 10 mg Date last ordered: 8/4/21 Qty: 30 Refills: 0     ADDERALL XR 10 MG 24 hr capsule 30 capsule 0 8/4/2021  Yes   Sig - Route: Take 1 capsule (10 mg) by mouth daily - Oral   Sent to pharmacy as: Adderall XR 10 MG Oral Capsule Extended Release 24 Hour   Class: E-Prescribe       Review of [place] ?: yes  Medication last filled date: Adderall XL 10 mg on 8/5/21 Qty filled: 30  Other controlled substance on [place] ?: yes  If yes, is this a new medication?: no  If yes, name of medication: NA and date filled: NA    Lapse in medication adherence greater than 5 days?: yes  If yes, call patient and gather details: SUZY for pt asking to contact the clinic ( med compliance verification)  Medication refill request verified as identical to current order?: yes  Result of Last DAM, VPA, Li+ Level, CBC, or Carbamazepine Level (at or since last visit): NA    Last visit treatment plan:   Plan:  I will make no medication changes at this time.  The patient will continue on Wellbutrin at 300 mg in the morning and 150 mg midday as well as the Adderall extended release at 10 mg a day    []Medication refilled per  Medication Refill in Ambulatory Care  policy.  [x]Medication unable to be refilled by RN due to criteria not met as indicated below:    []Eligibility - not seen in the last year   []Supervision - no future appointment   [x]Compliance - no shows, cancellations or lapse in therapy   []Verification - order discrepancy   [x]Controlled medication   [x]Medication not included in policy   []90-day supply request   [x]Other : LPN is processing request

## 2021-09-20 RX ORDER — DEXTROAMPHETAMINE/AMPHETAMINE 10 MG
10 CAPSULE, EXT RELEASE 24 HR ORAL DAILY
Qty: 30 CAPSULE | Refills: 0 | Status: SHIPPED | OUTPATIENT
Start: 2021-09-20 | End: 2021-10-20

## 2021-10-20 DIAGNOSIS — F90.9 ATTENTION DEFICIT HYPERACTIVITY DISORDER (ADHD), UNSPECIFIED ADHD TYPE: ICD-10-CM

## 2021-10-20 RX ORDER — DEXTROAMPHETAMINE/AMPHETAMINE 10 MG
10 CAPSULE, EXT RELEASE 24 HR ORAL DAILY
Qty: 30 CAPSULE | Refills: 0 | Status: SHIPPED | OUTPATIENT
Start: 2021-10-20 | End: 2021-12-10

## 2021-10-20 NOTE — TELEPHONE ENCOUNTER
Date of Last Office Visit: 8/4/21  Date of Next Office Visit: 11/3/21  No shows since last visit: 0  Cancellations since last visit: 0     Medication requested: Adderall XR 10 mg Date last ordered: 9/20/21 Qty: 30 Refills: 0        Review of MN ?: NO - this RN does not have delegate access to prescriber yet.      Lapse in medication adherence greater than 5 days?: no  If yes, call patient and gather details: na  Medication refill request verified as identical to current order?: yes  Result of Last DAM, VPA, Li+ Level, CBC, or Carbamazepine Level (at or since last visit): NA     Last visit treatment plan:   Plan:  I will make no medication changes at this time.  The patient will continue on Wellbutrin at 300 mg in the morning and 150 mg midday as well as the Adderall extended release at 10 mg a day     []?Medication refilled per  Medication Refill in Ambulatory Care  policy.  [x]?Medication unable to be refilled by RN due to criteria not met as indicated below:               []?Eligibility - not seen in the last year              []?Supervision - no future appointment              []?Compliance - no shows, cancellations or lapse in therapy              []?Verification - order discrepancy              [x]?Controlled medication              []?Medication not included in policy              []?90-day supply request              []?Other : LPN is processing request

## 2021-11-02 RX ORDER — DEXTROAMPHETAMINE/AMPHETAMINE 10 MG
10 CAPSULE, EXT RELEASE 24 HR ORAL DAILY
Qty: 30 CAPSULE | Refills: 0 | Status: CANCELLED | OUTPATIENT
Start: 2021-11-02

## 2021-11-03 ENCOUNTER — VIRTUAL VISIT (OUTPATIENT)
Dept: BEHAVIORAL HEALTH | Facility: CLINIC | Age: 51
End: 2021-11-03
Payer: COMMERCIAL

## 2021-11-03 DIAGNOSIS — F90.9 ATTENTION DEFICIT HYPERACTIVITY DISORDER (ADHD), UNSPECIFIED ADHD TYPE: ICD-10-CM

## 2021-11-03 PROCEDURE — 99213 OFFICE O/P EST LOW 20 MIN: CPT | Mod: TEL | Performed by: PSYCHIATRY & NEUROLOGY

## 2021-11-03 RX ORDER — BUPROPION HYDROCHLORIDE 150 MG/1
TABLET ORAL
Qty: 90 TABLET | Refills: 0 | Status: SHIPPED | OUTPATIENT
Start: 2021-11-03 | End: 2022-02-07

## 2021-11-03 NOTE — PATIENT INSTRUCTIONS
The patient will return to this clinic in 3 months for medication check.  I did not make any medication changes today.  Patient agrees to call with any questions or concerns.  The patient is requesting a 2-month supply of the medication rather than 1 month.  I am fine doing that but I believe some of the pharmacies will not allow that.  I did let the patient know that.

## 2021-11-03 NOTE — PROGRESS NOTES
Outpatient Psychiatry Progress note     The patient was seen on December 21 of 2020 for an initial psychiatric intake evaluation.  This was done by phone due to the coronavirus restrictions.  The patient has a history of moyamoya disease with a ischemic left MCA stroke occurring in December 2017.  She is followed through neurosurgery at [facility] and recently started treatment for underlying hyperlipidemia, starting fenofibrate in October 2019 due to her hypertriglyceridemia.  Neurosurgery reports her last imaging was in June 2019 and she was described as stable.  She reports to me that she has been medically cleared by neurosurgery to restart stimulant medication if we deem that appropriate.  She presents to this clinic with a longstanding ADHD.  At baseline she apparently struggles with some cognitive communication difficulties and some expressive aphasia related to the prior left MCA stroke.  This was diagnosed in fifth grade and she was on stimulants until eighth grade when she stopped the medication because she did not like being on medication.  She states she was put back on Adderall from 2010 until 2018 and that was extremely helpful with her underlying ADHD.  She reports that she also had been on Wellbutrin but was taken off that a few months ago because she was not sure it was helpful.  In hindsight she states her attention is gotten worse since the Wellbutrin has been discontinued.  She has had no prior psychiatric hospitalizations.  She is never had any suicidality.  No history of any psychosis or eating disorder symptoms.  No significant anxiety.  She denies having any significant depressive episodes but presents today requesting consideration for return to the stimulant medication as suggested by her neurosurgeon office due to the fact that she is struggling professionally as a  due to her ADD symptoms.    I saw the patient for a virtual visit for the intake on December 21 of  2020.  We restarted her Wellbutrin at that visit it had been effective for her in the past and in hindsight after stopping the medication she felt it was quite beneficial.  We did consider restarting a stimulant at that visit.    The patient for a clinic visit on February 3 of 2021.  She reported she had improved somewhat after starting the Wellbutrin and was requesting another increase.    I saw the patient on March 17 of 2021.  She was doing well and participated well.  She denied side effects.  There was no problems with blood pressure or any other medical issues.  She was sleeping well and eating well and she stated her concentration had improved.  We did not make any changes at that time.    I saw the patient in June 2021.  She was doing fairly well at that time but had been taking an extra Wellbutrin and we did cut back on that so she took it as prescribed.  She was not using the Adderall at that time and felt like her concentration had suffered a bit.  We did restart that medication.    The patient in August 2021.  She stated she was doing relatively well and felt that the increase in Adderall has been helpful.  Her mood was good.  She was productive at work.  She was tolerating the medication.  We did not make any medication changes.    HPI:  On my phone interview with the patient today she states she is doing well.  She states that she continues with the combination of Wellbutrin and Adderall and feels it is been extremely helpful.  She was hoping to get a longer supply of the Adderall so she did not have to go to the pharmacy so often and we will check into that but overall the patient is doing extremely well and does not want any medication changes.  She admits chronic low-level anxiety typically around entertaining for her family.  She sees them for dinner weekly which she prepares.  She states holidays are always a bit stressful.  She has had no desire to be dead or thoughts of hurting herself.  No  hallucinations or delusions.  No symptoms of heather.  She reports she is keeping up at work and she believes the medications have helped with concentration.  There is been no new medical diagnoses and no new allergies.  She is sleeping well and eating well.  She would like to continue with the current treatment plan and has no other questions at this time.    Current Medications:  Medications were personally reviewed at this meeting.  Please see the chart for current medication list.         Past medical History:  Patient Active Problem List   Diagnosis     Attention deficit hyperactivity disorder (ADHD), unspecified ADHD type     Cognitive communication deficit     Expressive aphasia     Hx of ischemic left MCA stroke - 12/2017 - due to Moyamoya disease     Iron deficiency anemia     Tolbert tolbert disease     S/P craniotomy     Thyroid nodule - FNA consistent with benign process 9/2019, repeat ultrasound 10/2020     Vitamin D deficiency     Hypertriglyceridemia     Elevated BP without diagnosis of hypertension     MAGEN II (cervical intraepithelial neoplasia II)   Past Surgical History:   Procedure Laterality Date     C INTRA-EXTRACRANIAL ART ANASTOM 01/11/2018   left extracranial intercranial bypass supine position     extracranial bypass 05/24/2018   right extracranial intracranial bypass     TONSILLECTOMY     [facility]            Mental Status Exam:  Appearance: Patient was interviewed by phone.  Cooperative and pleasant  Behavior: Patient was polite and pleasant.  She denies any recent behavioral difficulties.  Speech: Sentence structure was intact.  Good inflection.  The patient was able to participate appropriately.  She is able to initiate.  No loosening of association her answers were consistent and appropriate.  Tracking well.  Mood/Affect: No depression or anxiety. a.  Bright and happy.no lability or irritability.  No evidence of any heather.  Thought Content: No hallucinations or delusions  Suicidal or  Homicidal Thoughts: None apparent or reported  Thought Process/Formulation: Tracking and participating well.  Not loose.  Not disorganized.  Associations: Adequate  Fund of Knowledge: Adequate, no recent change.  Attention/Concentration: More attentive and tracking better.  Not disorganized.  No racing thoughts.  Insight: Grossly adequate  Judgement: Grossly intact  Memory: Grossly intact  Motor Status: Patient denies having any new tremors or asymmetries.  Fund of Knowledge: Grossly intact  Orientation: Grossly oriented        Diagnosis:    Attention deficit disorder, NOS, by history    Rule out cognitive disorder NOS secondary to prior CVA related to moyamoya disease    The patient interview, chart review and documentation took 22 minutes.     Plan:  We will continue with the current medications.    Patient will continue monitoring her blood pressure and heart rate.  She will call with any new medical issues or concerns.      The patient agrees to call with any questions or concerns.  The patient will seek out appropriate emergency services should that become necessary.  I will make myself available if any questions, concerns, or problems arise.       Fabricio Euceda MD

## 2021-11-03 NOTE — PROGRESS NOTES
This video/telephone visit will be conducted via a call between you and your physician/provider. We have found that certain health care needs can be provided without the need for an in-person physical exam. This service lets us provide the care you need with a video /telephone conversation. If a prescription is necessary we can send it directly to your pharmacy. If lab work is needed we can place an order for that and you can then stop by our lab to have the test done at a later time.    Just as we bill insurance for in-person visits, we also bill insurance for video/telephone visits. If you have questions about your insurance coverage, we recommend that you speak with your insurance company.    Patient has given verbal consent for video/Telephone visit? Yes  Please call pt at: [phone]     JORGE/SHIRIN DC    Patient verified allergies, medications and pharmacy via phone. Patient states sje is ready for visit.     Pt is requesting a 60 day supply vs a 30 day supply. Last filled of Adderall is 10/20/21       ________________________________  Medications Phoned  to Pharmacy [] yes [x]no  Name of Pharmacist:  List Medications, including dose, quantity and instructions     Medications ordered this visit were e-scribed.  Verified by order class [] yes  [x] no    Medication changes or discontinuations were communicated to patient's pharmacy: [] yes  [x] no     Dictation completed at time of chart check: [x] yes  [] no     I have checked the documentation for today s encounters and the above information has been reviewed and completed.      Jaya Birmingham MA on November 3, 2021 at 4:00 PM

## 2021-12-09 DIAGNOSIS — F90.9 ATTENTION DEFICIT HYPERACTIVITY DISORDER (ADHD), UNSPECIFIED ADHD TYPE: ICD-10-CM

## 2021-12-10 RX ORDER — DEXTROAMPHETAMINE/AMPHETAMINE 10 MG
10 CAPSULE, EXT RELEASE 24 HR ORAL DAILY
Qty: 30 CAPSULE | Refills: 0 | Status: SHIPPED | OUTPATIENT
Start: 2021-12-10 | End: 2022-02-07

## 2021-12-10 NOTE — TELEPHONE ENCOUNTER
Date of Last Office Visit: 11/3/2021  Date of Next Office Visit: 2/7/2022  No shows since last visit: none  Cancellations since last visit: none    Medication requested: Adderall 10 mg  Date last ordered: 10/20/202 Qty: 30 Refills: 0     Review of [place] ?: RN has no access to provider's .    Lapse in medication adherence greater than 5 days?: no  If yes, call patient and gather details: no  Medication refill request verified as identical to current order?: please review  Result of Last DAM, VPA, Li+ Level, CBC, or Carbamazepine Level (at or since last visit): N/A    Last visit treatment plan: Plan:  We will continue with the current medications.     Patient will continue monitoring her blood pressure and heart rate.  She will call with any new medical issues or concerns.        The patient agrees to call with any questions or concerns.  The patient will seek out appropriate emergency services should that become necessary.  I will make myself available if any questions, concerns, or problems arise.    []Medication refilled per  Medication Refill in Ambulatory Care  policy.  [x]Medication unable to be refilled by RN due to criteria not met as indicated below:    []Eligibility - not seen in the last year   []Supervision - no future appointment   []Compliance - no shows, cancellations or lapse in therapy   []Verification - order discrepancy   [x]Controlled medication   [x]Medication not included in policy   []90-day supply request   []Other

## 2022-02-07 ENCOUNTER — VIRTUAL VISIT (OUTPATIENT)
Dept: BEHAVIORAL HEALTH | Facility: CLINIC | Age: 52
End: 2022-02-07

## 2022-02-07 DIAGNOSIS — F90.9 ATTENTION DEFICIT HYPERACTIVITY DISORDER (ADHD), UNSPECIFIED ADHD TYPE: ICD-10-CM

## 2022-02-07 PROCEDURE — 99213 OFFICE O/P EST LOW 20 MIN: CPT | Mod: 95 | Performed by: PSYCHIATRY & NEUROLOGY

## 2022-02-07 RX ORDER — DEXTROAMPHETAMINE/AMPHETAMINE 10 MG
10 CAPSULE, EXT RELEASE 24 HR ORAL DAILY
Qty: 30 CAPSULE | Refills: 0 | Status: SHIPPED | OUTPATIENT
Start: 2022-02-07 | End: 2022-04-05

## 2022-02-07 RX ORDER — BUPROPION HYDROCHLORIDE 150 MG/1
TABLET ORAL
Qty: 90 TABLET | Refills: 3 | Status: SHIPPED | OUTPATIENT
Start: 2022-02-07

## 2022-02-07 NOTE — PROGRESS NOTES
This video/telephone visit will be conducted via a call between you and your physician/provider. We have found that certain health care needs can be provided without the need for an in-person physical exam. This service lets us provide the care you need with a video /telephone conversation. If a prescription is necessary we can send it directly to your pharmacy. If lab work is needed we can place an order for that and you can then stop by our lab to have the test done at a later time.    Just as we bill insurance for in-person visits, we also bill insurance for video/telephone visits. If you have questions about your insurance coverage, we recommend that you speak with your insurance company.    Patient has given verbal consent for video/Telephone visit? Yes   Patient would like telephone visit, please connect: [phone]  Reason for visit: follow up  Patient verified allergies, medications and pharmacy via telephone verbally with writer.  Medication refil's are pended for review and approval by provider.  Patient states she is ready for visit.  [place]  to be reviewed by provider.   Candie Abraham February 7, 2022 10:44 AM

## 2022-02-07 NOTE — PROGRESS NOTES
[facility] Post-Appointment Cart -check      Correct pharmacy verified with patient and updated in chart? [x] yes []no    Charge captured ? [] yes  [] no [x] n/a-virtual     Medications ordered this visit were e-scribed.  Verified by order class [x] yes  [] no    List Medications:  Adderall XR 10 mg; Wellbutrin  mg     Medication changes or discontinuations were communicated to patient's pharmacy: [] yes  [x] no    UA collected [] yes  [] no  [x] n/a-virtual     Outside referrals / labs, etc support staff to follow up: [] yes  [x] no    Future appointment was made: [] yes  [x] no  [] n/a   RTC 3-4 months  Dictation completed at time of chart check: [x] yes  [] no    I have checked the documentation for today s encounters and the above information has been reviewed and completed.      Candie Abraham on February 7, 2022 at 11:14 AM

## 2022-02-07 NOTE — PROGRESS NOTES
Outpatient Psychiatry Progress note     The patient was seen on December 21 of 2020 for an initial psychiatric intake evaluation.  This was done by phone due to the coronavirus restrictions.  The patient has a history of moyamoya disease with a ischemic left MCA stroke occurring in December 2017.  She is followed through neurosurgery at [facility] and recently started treatment for underlying hyperlipidemia, starting fenofibrate in October 2019 due to her hypertriglyceridemia.  Neurosurgery reports her last imaging was in June 2019 and she was described as stable.  She reports to me that she has been medically cleared by neurosurgery to restart stimulant medication if we deem that appropriate.  She presents to this clinic with a longstanding ADHD.  At baseline she apparently struggles with some cognitive communication difficulties and some expressive aphasia related to the prior left MCA stroke.  This was diagnosed in fifth grade and she was on stimulants until eighth grade when she stopped the medication because she did not like being on medication.  She states she was put back on Adderall from 2010 until 2018 and that was extremely helpful with her underlying ADHD.  She reports that she also had been on Wellbutrin but was taken off that a few months ago because she was not sure it was helpful.  In hindsight she states her attention is gotten worse since the Wellbutrin has been discontinued.  She has had no prior psychiatric hospitalizations.  She is never had any suicidality.  No history of any psychosis or eating disorder symptoms.  No significant anxiety.  She denies having any significant depressive episodes but presents today requesting consideration for return to the stimulant medication as suggested by her neurosurgeon office due to the fact that she is struggling professionally as a  due to her ADD symptoms.    I saw the patient for a virtual visit for the intake on December 21 of  2020.  We restarted her Wellbutrin at that visit it had been effective for her in the past and in hindsight after stopping the medication she felt it was quite beneficial.  We did consider restarting a stimulant at that visit.    The patient for a clinic visit on February 3 of 2021.  She reported she had improved somewhat after starting the Wellbutrin and was requesting another increase.    I saw the patient on March 17 of 2021.  She was doing well and participated well.  She denied side effects.  There was no problems with blood pressure or any other medical issues.  She was sleeping well and eating well and she stated her concentration had improved.  We did not make any changes at that time.    I saw the patient in June 2021.  She was doing fairly well at that time but had been taking an extra Wellbutrin and we did cut back on that so she took it as prescribed.  She was not using the Adderall at that time and felt like her concentration had suffered a bit.  We did restart that medication.    The patient in August 2021.  She stated she was doing relatively well and felt that the increase in Adderall has been helpful.  Her mood was good.  She was productive at work.  She was tolerating the medication.  We did not make any medication changes.      I saw the patient in November 2021.  She was doing well at that time and continued with the combination of Wellbutrin and Adderall.  She felt the medication was helpful and well-tolerated.  She requested a longer supply of the Adderall and we did allow 2 months.  She still has some low-level anxiety when she was around family members but it was manageable.  We did not make any medication changes.    HPI:  On my phone interview with the patient today she told me she was doing well and was tolerating the medication and she did not want any medication changes.  She stated that she was sporadically taking her stimulants until a few weeks ago when work demands went up and she  started taking her stimulant as prescribed and she reports that is been extremely helpful.  She reports she is keeping up at work and concentration and focus and energy are all good.  She is having no side effects of the medication.    The patient denies having any depression.  No feelings of hopelessness or helplessness or worthlessness.  No desire to be dead or thoughts of harming herself.  She reports she is sleeping well and eating well.  There is been no new medical diagnoses.  No new allergies.  She is tolerating the medication and believes that they have been very helpful.  She reports no new medical issues or concerns.  No new allergies.  No side effects to the medication.    Current Medications:  Medications were personally reviewed at this meeting.  Please see the chart for current medication list.         Past medical History:  Patient Active Problem List   Diagnosis     Attention deficit hyperactivity disorder (ADHD), unspecified ADHD type     Cognitive communication deficit     Expressive aphasia     Hx of ischemic left MCA stroke - 12/2017 - due to Moyamoya disease     Iron deficiency anemia     Tolbert tolbert disease     S/P craniotomy     Thyroid nodule - FNA consistent with benign process 9/2019, repeat ultrasound 10/2020     Vitamin D deficiency     Hypertriglyceridemia     Elevated BP without diagnosis of hypertension     MAGEN II (cervical intraepithelial neoplasia II)   Past Surgical History:   Procedure Laterality Date     C INTRA-EXTRACRANIAL ART ANASTOM 01/11/2018   left extracranial intercranial bypass supine position     extracranial bypass 05/24/2018   right extracranial intracranial bypass     TONSILLECTOMY     [facility]            Mental Status Exam:  Appearance: Patient was interviewed by phone.  She was cooperative and pleasant.  Speech: Able to initiate and participate.  Good inflection.  Answers were consistent and appropriate.  Not pressured or rambling..  Mood/Affect: No  depression or anxiety.  No irritability or lability.  Thought Content: No hallucinations or delusions  Suicidal or Homicidal Thoughts: None apparent or reported  Thought Process/Formulation: Not disorganized.  Tracking well.  Participating well.  No delay.  Associations: Adequate  Fund of Knowledge: Adequate, no recent change.  Attention/Concentration: Attentive.  Tracking well.  Not disorganized..  Insight: Grossly adequate  Judgement: Grossly intact  Memory: Grossly intact  Motor Status: Patient denies having any new tremors or asymmetries.  Fund of Knowledge: Grossly intact  Orientation: Grossly oriented        Diagnosis:    Attention deficit disorder, NOS, by history    Rule out cognitive disorder NOS secondary to prior CVA related to moyamoya disease    The patient interview, chart review and documentation took 19 minutes.     Plan:  We will continue with the current medications.    No side effects to the medication.  She continues to follow-up with her medical team.      The patient agrees to call with any questions or concerns.  The patient will seek out appropriate emergency services should that become necessary.  I will make myself available if any questions, concerns, or problems arise.       Fabricio Euceda MD

## 2022-02-19 ENCOUNTER — HEALTH MAINTENANCE LETTER (OUTPATIENT)
Age: 52
End: 2022-02-19

## 2022-03-07 ENCOUNTER — TELEPHONE (OUTPATIENT)
Dept: NEUROLOGY | Facility: CLINIC | Age: 52
End: 2022-03-07
Payer: COMMERCIAL

## 2022-03-07 ENCOUNTER — NURSE TRIAGE (OUTPATIENT)
Dept: NURSING | Facility: CLINIC | Age: 52
End: 2022-03-07
Payer: COMMERCIAL

## 2022-03-07 NOTE — TELEPHONE ENCOUNTER
Can we route this through the normal medication refill request. I would like the staff to review when her last med refill was and whether she is due for it. Is there a reason that this came through this process? If I have the patient on the medication and they're making their appointments and the patient is due to be refilled we can refill the medication. If there is other issues that need to be addressed we may need an appointment or the staff need to get additional information.

## 2022-03-07 NOTE — TELEPHONE ENCOUNTER
Mariela is calling and states that she is needing a refill for Adderall XR 10 mg 24 hr and Bupropion (Wellbutrin XL).  Patient is needing prescription filled as soon as possible.  Pharmacy of choice is [facility] on [address] in [place], [place].  Please phone patient when this has been completed.        COVID 19 Nurse Triage Plan/Patient Instructions    Please be aware that novel coronavirus (COVID-19) may be circulating in the community. If you develop symptoms such as fever, cough, or SOB or if you have concerns about the presence of another infection including coronavirus (COVID-19), please contact your health care provider or visit [email or web address].     Disposition/Instructions    Home care recommended. Follow home care protocol based instructions.    Thank you for taking steps to prevent the spread of this virus.  o Limit your contact with others.  o Wear a simple mask to cover your cough.  o Wash your hands well and often.    Resources    [facility]: About COVID-19: [email or web address]    CDC: What to Do If You're Sick: www.cdc.gov/coronavirus/2019-ncov/about/steps-when-sick.html    CDC: Ending Home Isolation: www.cdc.gov/coronavirus/2019-ncov/hcp/disposition-in-home-patients.html     CDC: Caring for Someone: www.cdc.gov/coronavirus/2019-ncov/if-you-are-sick/care-for-someone.html     [facility]: Interim Guidance for Hospital Discharge to Home: www.[email or web address].[email or web address]/diseases/coronavirus/hcp/hospdischarge.pdf    [facility] clinical trials (COVID-19 research studies): [email or web address]-[email or web address]     Below are the COVID-19 hotlines at the [facility] of Health ([facility]). Interpreters are available.   o For health questions: Call 122-566-4350 or 1-245.293.6783 (7 a.m. to 7 p.m.)  o For questions about schools and childcare: Call 485-074-0447 or 1-330.356.6854 (7 a.m. to 7 p.m.)

## 2022-04-01 ENCOUNTER — OFFICE VISIT (OUTPATIENT)
Dept: URGENT CARE | Facility: URGENT CARE | Age: 52
End: 2022-04-01
Payer: COMMERCIAL

## 2022-04-01 VITALS
DIASTOLIC BLOOD PRESSURE: 90 MMHG | TEMPERATURE: 97.5 F | WEIGHT: 130 LBS | SYSTOLIC BLOOD PRESSURE: 143 MMHG | RESPIRATION RATE: 16 BRPM | OXYGEN SATURATION: 98 % | BODY MASS INDEX: 23.97 KG/M2 | HEART RATE: 100 BPM

## 2022-04-01 DIAGNOSIS — L03.116 CELLULITIS OF LEFT LOWER EXTREMITY: Primary | ICD-10-CM

## 2022-04-01 PROCEDURE — 99214 OFFICE O/P EST MOD 30 MIN: CPT | Mod: 25 | Performed by: FAMILY MEDICINE

## 2022-04-01 PROCEDURE — 96372 THER/PROPH/DIAG INJ SC/IM: CPT | Performed by: FAMILY MEDICINE

## 2022-04-01 RX ORDER — CEPHALEXIN 500 MG/1
500 CAPSULE ORAL 4 TIMES DAILY
Qty: 40 CAPSULE | Refills: 0 | Status: SHIPPED | OUTPATIENT
Start: 2022-04-01 | End: 2022-04-11

## 2022-04-01 RX ORDER — CEFTRIAXONE SODIUM 1 G
1 VIAL (EA) INJECTION ONCE
Status: COMPLETED | OUTPATIENT
Start: 2022-04-01 | End: 2022-04-01

## 2022-04-01 RX ADMIN — Medication 1 G: at 17:10

## 2022-04-01 NOTE — PROGRESS NOTES
Clinic Administered Medication Documentation    Administrations This Visit     cefTRIAXone (ROCEPHIN) injection 1 g     Admin Date  04/01/2022 Action  Given Dose  1 g Route  Intramuscular Site  Left Ventrogluteal Administered By  Renee Esteban CMA    Ordering Provider: Nadir Crawford, DO    Patient Supplied?: No                Renee Esteban CMA on 4/1/2022 at 5:10 PM

## 2022-04-01 NOTE — PROGRESS NOTES
SUBJECTIVE: Mariela Rodriguez is a 51 year old female presenting with a chief complaint of left 4th toe with blister 10 days ago and now redness/tenderness.  Onset of symptoms was 10 day(s) ago.  Course of illness is worsening.    Current and Associated symptoms: none  Predisposing factors include blister initially.    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[place]oya disease 2017    Left watershed type stroke from 2017      Iron deficiency anemia 4/10/2018     Tolbert tolbert disease 2018     S/P craniotomy 2018     Thyroid nodule - FNA consistent with benign process 2019, repeat ultrasound 10/2020 2017    FNA consistent with benign process 2019, repeat ultrasound 10/2020     Vitamin D deficiency 2018     No Known Allergies  Social History     Tobacco Use     Smoking status: Former Smoker     Quit date:      Years since quittin.2     Smokeless tobacco: Never Used     Tobacco comment: social smoker    Substance Use Topics     Alcohol use: Yes     Alcohol/week: 5.0 standard drinks     Types: 5 Standard drinks or equivalent per week       ROS:  SKIN: no rash  GI: no vomiting    OBJECTIVE:  BP (!) 143/90 (BP Location: Left arm, Patient Position: Sitting, Cuff Size: Adult Regular)   Pulse 100   Temp 97.5  F (36.4  C) (Tympanic)   Resp 16   Wt 59 kg (130 lb)   SpO2 98%   BMI 23.97 kg/m  GENERAL APPEARANCE: healthy, alert and no distress  NEURO: Normal strength and tone, sensory exam grossly normal,  normal speech and mentation  SKIN: 4th toe with redness and tenderness left with slight redness up foot left      ICD-10-CM    1. Cellulitis of left lower extremity  L03.116 cefTRIAXone (ROCEPHIN) injection 1 g     cephALEXin (KEFLEX) 500 MG capsule     Soaked foot and  dressing  Fluids/Rest, f/u if worse/not any better

## 2022-04-05 DIAGNOSIS — F90.9 ATTENTION DEFICIT HYPERACTIVITY DISORDER (ADHD), UNSPECIFIED ADHD TYPE: ICD-10-CM

## 2022-04-05 RX ORDER — DEXTROAMPHETAMINE/AMPHETAMINE 10 MG
CAPSULE, EXT RELEASE 24 HR ORAL
Qty: 30 CAPSULE | Refills: 0 | Status: SHIPPED | OUTPATIENT
Start: 2022-04-05 | End: 2022-05-13

## 2022-04-05 NOTE — TELEPHONE ENCOUNTER
Date of Last Office Visit: 02/07/2022  Date of Next Office Visit: 06/06/2022  No shows since last visit: None  Cancellations since last visit:     Medication requested:ADDERALL XR 10 MG 24 hr capsule  Date last ordered: 02/07/2022 Qty: 30 Refills: 0     Review of MN ?: Writer does not have access.      Lapse in medication adherence greater than 5 days?: Yes   If yes, call patient and gather details: unable to reach Patient.   Medication refill request verified as identical to current order?: Yes  Result of Last DAM, VPA, Li+ Level, CBC, or Carbamazepine Level (at or since last visit): N/A    Last visit treatment plan: 02/07/2022  Plan:  We will continue with the current medications.     No side effects to the medication.  She continues to follow-up with her medical team.    []Medication refilled per  Medication Refill in Ambulatory Care  policy.  [x]Medication unable to be refilled by RN due to criteria not met as indicated below:    []Eligibility - not seen in the last year   []Supervision - no future appointment   [x]Compliance - no shows, cancellations or lapse in therapy   []Verification - order discrepancy    [x]Controlled medication   []Medication not included in policy   []90-day supply request   [x]Other CMA pending medication refills per CM/SMB

## 2022-05-12 DIAGNOSIS — F90.9 ATTENTION DEFICIT HYPERACTIVITY DISORDER (ADHD), UNSPECIFIED ADHD TYPE: ICD-10-CM

## 2022-05-12 NOTE — TELEPHONE ENCOUNTER
Reason for Call: medication refill:    Do you use a [facility]?  Name of the pharmacy and phone number for the current request:  [facility] [place]    Name of the medication requested: Adderral    Other request: Needing refill    Can we leave a detailed message on this number? YES    Phone number patient can be reached at: Cell number on file:    Telephone Information:   Mobile [phone]       Best Time: any    Call taken on 5/12/2022 at 11:13 AM by Edilberto Thomason

## 2022-05-13 RX ORDER — DEXTROAMPHETAMINE/AMPHETAMINE 10 MG
10 CAPSULE, EXT RELEASE 24 HR ORAL DAILY
Qty: 30 CAPSULE | Refills: 0 | Status: SHIPPED | OUTPATIENT
Start: 2022-05-13

## 2022-10-16 ENCOUNTER — HEALTH MAINTENANCE LETTER (OUTPATIENT)
Age: 52
End: 2022-10-16

## 2023-01-21 NOTE — PATIENT INSTRUCTIONS
Bed: 19  Expected date:   Expected time:   Means of arrival:   Comments:  [place] when clean   The patient is doing well and tolerating the medication.  I will make no medication changes at this time.  She should return to this clinic for medication follow-up in 3 to 4 months.  She will continue to follow with her primary care doctor to monitor blood pressure as well as other medical issues.

## 2023-04-01 ENCOUNTER — HEALTH MAINTENANCE LETTER (OUTPATIENT)
Age: 53
End: 2023-04-01

## 2023-09-28 NOTE — TELEPHONE ENCOUNTER
"fenofibrate (TRIGLIDE/LOFIBRA) 160 MG tablet 90 tablet 0 6/3/2020  No   Sig - Route: Take 1 tablet (160 mg) by mouth daily - Oral   Sent to pharmacy as: Fenofibrate 160 MG Oral Tablet (TRIGLIDE/LOFIBRA)   Class: E-Prescribe   Order: [id number]       Last office visit: 2/13/2020 with prescribing provider:   Future Office Visit:          Requested Prescriptions   Pending Prescriptions Disp Refills     fenofibrate (TRIGLIDE/LOFIBRA) 160 MG tablet 90 tablet 0     Sig: Take 1 tablet (160 mg) by mouth daily       Fibrates Failed - 10/22/2020  9:14 AM        Failed - Lipid panel on file in past 12 months     Recent Labs   Lab Test 09/17/19  1011   CHOL 280*   TRIG 750*   HDL 35*   LDL Cannot estimate LDL when triglyceride exceeds 400 mg/dL  105*   NHDL 245*               Passed - No abnormal creatine kinase in past 12 months     No lab results found.             Passed - Recent (12 mo) or future (30 days) visit within the authorizing provider's specialty     Patient has had an office visit with the authorizing provider or a provider within the authorizing providers department within the previous 12 mos or has a future within next 30 days. See \"Patient Info\" tab in inbasket, or \"Choose Columns\" in Meds & Orders section of the refill encounter.              Passed - Medication is active on med list        Passed - Patient is age 18 or older        Passed - No active pregnancy on record        Passed - No positive pregnancy test in past 12 months         Due for an Office visit for further refills, only fill for 30 days     Celestina White RN, BSN  [facility]       " Patient seems intravascularly dry.  Hold IV Lasix for now.

## 2024-05-20 NOTE — TELEPHONE ENCOUNTER
4 tabs left    Date of Last Office Visit: 2/7/2022  Date of Next Office Visit: 6/6/2022  No shows since last visit: 0  Cancellations since last visit: 0    Medication requested: ADDERALL XR 10 MG 24 hr capsule Date last ordered: 4/5/2022 Qty: 30 Refills: 0     Review of MN ?: Not a current delegate of provider    Lapse in medication adherence greater than 5 days?: No   If yes, call patient and gather details: Has 4 tabs left per patient.  Medication refill request verified as identical to current order?: Yes    Result of Last DAM, VPA, Li+ Level, CBC, or Carbamazepine Level (at or since last visit): N/A    Last visit treatment plan:   Instructions       Return in about 4 months (around 6/7/2022).  The patient is doing well and tolerating the medication.  I will make no medication changes at this time.  She should return to this clinic for medication follow-up in 3 to 4 months.  She will continue to follow with her primary care doctor to monitor blood pressure as well as other medical issues.               []Medication refilled per  Medication Refill in Ambulatory Care  policy.  [x]Medication unable to be refilled by RN due to criteria not met as indicated below:    []Eligibility - not seen in the last year   []Supervision - no future appointment   []Compliance - no shows, cancellations or lapse in therapy   []Verification - order discrepancy   [x]Controlled medication   []Medication not included in policy   []90-day supply request   []Other   164

## 2024-06-01 ENCOUNTER — HEALTH MAINTENANCE LETTER (OUTPATIENT)
Age: 54
End: 2024-06-01

## 2025-04-12 ENCOUNTER — HEALTH MAINTENANCE LETTER (OUTPATIENT)
Age: 55
End: 2025-04-12

## 2025-06-14 ENCOUNTER — HEALTH MAINTENANCE LETTER (OUTPATIENT)
Age: 55
End: 2025-06-14